# Patient Record
Sex: MALE | Race: OTHER | URBAN - METROPOLITAN AREA
[De-identification: names, ages, dates, MRNs, and addresses within clinical notes are randomized per-mention and may not be internally consistent; named-entity substitution may affect disease eponyms.]

---

## 2020-05-28 ENCOUNTER — EMERGENCY (EMERGENCY)
Facility: HOSPITAL | Age: 55
LOS: 1 days | Discharge: HOME | End: 2020-05-28
Attending: EMERGENCY MEDICINE | Admitting: EMERGENCY MEDICINE
Payer: COMMERCIAL

## 2020-05-28 VITALS
HEIGHT: 63 IN | HEART RATE: 88 BPM | OXYGEN SATURATION: 96 % | DIASTOLIC BLOOD PRESSURE: 88 MMHG | RESPIRATION RATE: 18 BRPM | WEIGHT: 154.1 LBS | SYSTOLIC BLOOD PRESSURE: 144 MMHG | TEMPERATURE: 98 F

## 2020-05-28 DIAGNOSIS — K46.9 UNSPECIFIED ABDOMINAL HERNIA WITHOUT OBSTRUCTION OR GANGRENE: Chronic | ICD-10-CM

## 2020-05-28 LAB

## 2020-05-28 PROCEDURE — 81003 URINALYSIS AUTO W/O SCOPE: CPT

## 2020-05-28 PROCEDURE — 87491 CHLMYD TRACH DNA AMP PROBE: CPT

## 2020-05-28 PROCEDURE — 99284 EMERGENCY DEPT VISIT MOD MDM: CPT | Mod: 25

## 2020-05-28 PROCEDURE — 93975 VASCULAR STUDY: CPT

## 2020-05-28 PROCEDURE — 87591 N.GONORRHOEAE DNA AMP PROB: CPT

## 2020-05-28 PROCEDURE — 99284 EMERGENCY DEPT VISIT MOD MDM: CPT

## 2020-05-28 PROCEDURE — 93975 VASCULAR STUDY: CPT | Mod: 26

## 2020-05-28 RX ORDER — CIPROFLOXACIN LACTATE 400MG/40ML
500 VIAL (ML) INTRAVENOUS ONCE
Refills: 0 | Status: COMPLETED | OUTPATIENT
Start: 2020-05-28 | End: 2020-05-28

## 2020-05-28 RX ORDER — MOXIFLOXACIN HYDROCHLORIDE TABLETS, 400 MG 400 MG/1
1 TABLET, FILM COATED ORAL
Qty: 20 | Refills: 0
Start: 2020-05-28 | End: 2020-06-06

## 2020-05-28 RX ADMIN — Medication 500 MILLIGRAM(S): at 15:24

## 2020-05-28 NOTE — ED PROVIDER NOTE - PATIENT PORTAL LINK FT
You can access the FollowMyHealth Patient Portal offered by [facility] by registering at the following website: [email or web address]. By joining [email or web address]’s FollowMyHealth portal, you will also be able to view your health information using other applications (apps) compatible with our system.

## 2020-05-28 NOTE — ED ADULT NURSE NOTE - OBJECTIVE STATEMENT
Pt c/o rt testicular pain. 7/10. Received Doxycycline 100mg po x10 days from  with relief. Post completion of antibiotic pain resurfaced.

## 2020-05-28 NOTE — ED PROVIDER NOTE - CONSTITUTIONAL, MLM
normal... Well appearing,  male, awake, alert, oriented to person, place, time/situation and in no apparent distress.

## 2020-05-28 NOTE — ED PROVIDER NOTE - CARE PROVIDER_API CALL
John Wolf  UROLOGY  [address] [place] [address] [address]  [place] [address]  Phone: [phone]  Fax: [phone]  Follow Up Time:

## 2020-05-28 NOTE — ED PROVIDER NOTE - OBJECTIVE STATEMENT
55 yo  male presents to ED today c/o few days of aching, swelling to the side of right testicle. Patient had similar symptoms few weeks ago at which time, via Telehealth, Doxycycline was prescribed. Symptoms went away but returned few days ago after finishing course of Doxycycline. At this time patient denies any fever, chills, nausea, vomiting, diarrhea, dysuria or hematuria.

## 2020-05-28 NOTE — ED PROVIDER NOTE - CARE PROVIDERS DIRECT ADDRESSES
[email or web address]

## 2020-05-28 NOTE — ED ADULT NURSE NOTE - CHPI ED NUR SYMPTOMS NEG
no dysuria/no chills/no diarrhea/no nausea/no fever/no hematuria/no abdominal distension/no blood in stool

## 2020-05-28 NOTE — ED ADULT NURSE NOTE - NSIMPLEMENTINTERV_GEN_ALL_ED
Implemented All Universal Safety Interventions:  [place] to call system. Call bell, personal items and telephone within reach. Instruct patient to call for assistance. Room bathroom lighting operational. Non-slip footwear when patient is off stretcher. Physically safe environment: no spills, clutter or unnecessary equipment. Stretcher in lowest position, wheels locked, appropriate side rails in place.

## 2020-05-28 NOTE — ED ADULT TRIAGE NOTE - CHIEF COMPLAINT QUOTE
C/o right testicular pain. States he has associated burning with urination. C/o right testicular pain. States the pain started three weeks ago, he was given antibiotic which helped but then the pain came back last night. States his testicle is red and swollen. States he has associated burning with urination.

## 2020-05-28 NOTE — ED ADULT NURSE NOTE - CHIEF COMPLAINT QUOTE
C/o right testicular pain. States the pain started three weeks ago, he was given antibiotic which helped but then the pain came back last night. States his testicle is red and swollen. States he has associated burning with urination.

## 2020-05-28 NOTE — ED PROVIDER NOTE - NSFOLLOWUPINSTRUCTIONS_ED_ALL_ED_FT
Rest  Start taking CIPRO 500mg, one tablet every 12-hours for the next 10-days  Follow-up with your doctor in 2-3 days for re-evaluation  Return here if needed

## 2020-05-29 LAB
C TRACH RRNA SPEC QL NAA+PROBE: SIGNIFICANT CHANGE UP
N GONORRHOEA RRNA SPEC QL NAA+PROBE: SIGNIFICANT CHANGE UP
SPECIMEN SOURCE: SIGNIFICANT CHANGE UP

## 2020-06-18 NOTE — ED ADULT NURSE NOTE - GENITOURINARY ASSESSMENT
Patient remained stable on home O2. Cough treated symptomatically. Pulmonology consulted, recommended PleurX catheter and patient was amenable to this, placed on 6/19 and drained 850 mLs. Patient had costochondritis chest pain and cough throughout admission, treated symptomatically. Patient was discharged with home health PleurX care.   - - -

## 2021-07-07 NOTE — ED PROVIDER NOTE - NSCAREINITIATED _GEN_ER
----- Message from Clemencia Angel, [address] sent at 7/7/2021 10:15 AM EDT -----  Please let the patient know that his lab work was normal 
Left message advising that labs were normal 
Benjamin Valadez(Attending)

## 2021-07-22 ENCOUNTER — EMERGENCY (EMERGENCY)
Facility: HOSPITAL | Age: 56
LOS: 1 days | Discharge: OTHER INSTITUTION | End: 2021-07-22
Attending: EMERGENCY MEDICINE | Admitting: EMERGENCY MEDICINE
Payer: COMMERCIAL

## 2021-07-22 VITALS
OXYGEN SATURATION: 98 % | SYSTOLIC BLOOD PRESSURE: 113 MMHG | DIASTOLIC BLOOD PRESSURE: 73 MMHG | HEART RATE: 113 BPM | HEIGHT: 63 IN | RESPIRATION RATE: 982 BRPM | WEIGHT: 158.07 LBS

## 2021-07-22 DIAGNOSIS — K46.9 UNSPECIFIED ABDOMINAL HERNIA WITHOUT OBSTRUCTION OR GANGRENE: Chronic | ICD-10-CM

## 2021-07-22 LAB
ALBUMIN SERPL ELPH-MCNC: 3.8 G/DL — SIGNIFICANT CHANGE UP (ref 3.3–5)
ALP SERPL-CCNC: 76 U/L — SIGNIFICANT CHANGE UP (ref 40–120)
ALT FLD-CCNC: 48 U/L — SIGNIFICANT CHANGE UP (ref 12–78)
ANION GAP SERPL CALC-SCNC: 5 MMOL/L — SIGNIFICANT CHANGE UP (ref 5–17)
APPEARANCE UR: CLEAR — SIGNIFICANT CHANGE UP
AST SERPL-CCNC: 17 U/L — SIGNIFICANT CHANGE UP (ref 15–37)
BACTERIA # UR AUTO: ABNORMAL
BASOPHILS # BLD AUTO: 0.02 K/UL — SIGNIFICANT CHANGE UP (ref 0–0.2)
BASOPHILS NFR BLD AUTO: 0.3 % — SIGNIFICANT CHANGE UP (ref 0–2)
BILIRUB SERPL-MCNC: 0.2 MG/DL — SIGNIFICANT CHANGE UP (ref 0.2–1.2)
BILIRUB UR-MCNC: NEGATIVE — SIGNIFICANT CHANGE UP
BUN SERPL-MCNC: 11 MG/DL — SIGNIFICANT CHANGE UP (ref 7–23)
CALCIUM SERPL-MCNC: 8.8 MG/DL — SIGNIFICANT CHANGE UP (ref 8.5–10.1)
CHLORIDE SERPL-SCNC: 111 MMOL/L — HIGH (ref 96–108)
CO2 SERPL-SCNC: 25 MMOL/L — SIGNIFICANT CHANGE UP (ref 22–31)
COLOR SPEC: YELLOW — SIGNIFICANT CHANGE UP
CREAT SERPL-MCNC: 0.84 MG/DL — SIGNIFICANT CHANGE UP (ref 0.5–1.3)
DIFF PNL FLD: NEGATIVE — SIGNIFICANT CHANGE UP
EOSINOPHIL # BLD AUTO: 0.02 K/UL — SIGNIFICANT CHANGE UP (ref 0–0.5)
EOSINOPHIL NFR BLD AUTO: 0.3 % — SIGNIFICANT CHANGE UP (ref 0–6)
EPI CELLS # UR: SIGNIFICANT CHANGE UP
GLUCOSE SERPL-MCNC: 101 MG/DL — HIGH (ref 70–99)
GLUCOSE UR QL: NEGATIVE — SIGNIFICANT CHANGE UP
HCT VFR BLD CALC: 43.8 % — SIGNIFICANT CHANGE UP (ref 39–50)
HGB BLD-MCNC: 14.5 G/DL — SIGNIFICANT CHANGE UP (ref 13–17)
IMM GRANULOCYTES NFR BLD AUTO: 0.5 % — SIGNIFICANT CHANGE UP (ref 0–1.5)
KETONES UR-MCNC: NEGATIVE — SIGNIFICANT CHANGE UP
LEUKOCYTE ESTERASE UR-ACNC: NEGATIVE — SIGNIFICANT CHANGE UP
LYMPHOCYTES # BLD AUTO: 1.31 K/UL — SIGNIFICANT CHANGE UP (ref 1–3.3)
LYMPHOCYTES # BLD AUTO: 21.9 % — SIGNIFICANT CHANGE UP (ref 13–44)
MCHC RBC-ENTMCNC: 27.3 PG — SIGNIFICANT CHANGE UP (ref 27–34)
MCHC RBC-ENTMCNC: 33.1 GM/DL — SIGNIFICANT CHANGE UP (ref 32–36)
MCV RBC AUTO: 82.5 FL — SIGNIFICANT CHANGE UP (ref 80–100)
MONOCYTES # BLD AUTO: 0.38 K/UL — SIGNIFICANT CHANGE UP (ref 0–0.9)
MONOCYTES NFR BLD AUTO: 6.4 % — SIGNIFICANT CHANGE UP (ref 2–14)
NEUTROPHILS # BLD AUTO: 4.22 K/UL — SIGNIFICANT CHANGE UP (ref 1.8–7.4)
NEUTROPHILS NFR BLD AUTO: 70.6 % — SIGNIFICANT CHANGE UP (ref 43–77)
NITRITE UR-MCNC: NEGATIVE — SIGNIFICANT CHANGE UP
NRBC # BLD: 0 /100 WBCS — SIGNIFICANT CHANGE UP (ref 0–0)
PH UR: 5 — SIGNIFICANT CHANGE UP (ref 5–8)
PLATELET # BLD AUTO: 369 K/UL — SIGNIFICANT CHANGE UP (ref 150–400)
POTASSIUM SERPL-MCNC: 4.1 MMOL/L — SIGNIFICANT CHANGE UP (ref 3.5–5.3)
POTASSIUM SERPL-SCNC: 4.1 MMOL/L — SIGNIFICANT CHANGE UP (ref 3.5–5.3)
PROT SERPL-MCNC: 7.1 G/DL — SIGNIFICANT CHANGE UP (ref 6–8.3)
PROT UR-MCNC: NEGATIVE — SIGNIFICANT CHANGE UP
RBC # BLD: 5.31 M/UL — SIGNIFICANT CHANGE UP (ref 4.2–5.8)
RBC # FLD: 12.9 % — SIGNIFICANT CHANGE UP (ref 10.3–14.5)
RBC CASTS # UR COMP ASSIST: SIGNIFICANT CHANGE UP /HPF (ref 0–4)
SODIUM SERPL-SCNC: 141 MMOL/L — SIGNIFICANT CHANGE UP (ref 135–145)
SP GR SPEC: 1 — LOW (ref 1.01–1.02)
UROBILINOGEN FLD QL: NEGATIVE — SIGNIFICANT CHANGE UP
WBC # BLD: 5.98 K/UL — SIGNIFICANT CHANGE UP (ref 3.8–10.5)
WBC # FLD AUTO: 5.98 K/UL — SIGNIFICANT CHANGE UP (ref 3.8–10.5)
WBC UR QL: SIGNIFICANT CHANGE UP

## 2021-07-22 PROCEDURE — 74176 CT ABD & PELVIS W/O CONTRAST: CPT | Mod: 26,MA

## 2021-07-22 PROCEDURE — 80053 COMPREHEN METABOLIC PANEL: CPT

## 2021-07-22 PROCEDURE — 87086 URINE CULTURE/COLONY COUNT: CPT

## 2021-07-22 PROCEDURE — 93975 VASCULAR STUDY: CPT

## 2021-07-22 PROCEDURE — 81001 URINALYSIS AUTO W/SCOPE: CPT

## 2021-07-22 PROCEDURE — 76870 US EXAM SCROTUM: CPT

## 2021-07-22 PROCEDURE — 96374 THER/PROPH/DIAG INJ IV PUSH: CPT

## 2021-07-22 PROCEDURE — 76870 US EXAM SCROTUM: CPT | Mod: 26

## 2021-07-22 PROCEDURE — 99284 EMERGENCY DEPT VISIT MOD MDM: CPT | Mod: 25

## 2021-07-22 PROCEDURE — 36415 COLL VENOUS BLD VENIPUNCTURE: CPT

## 2021-07-22 PROCEDURE — 85025 COMPLETE CBC W/AUTO DIFF WBC: CPT

## 2021-07-22 PROCEDURE — 99284 EMERGENCY DEPT VISIT MOD MDM: CPT

## 2021-07-22 PROCEDURE — 93975 VASCULAR STUDY: CPT | Mod: 26

## 2021-07-22 PROCEDURE — 74176 CT ABD & PELVIS W/O CONTRAST: CPT | Mod: MA

## 2021-07-22 RX ORDER — KETOROLAC TROMETHAMINE 30 MG/ML
15 SYRINGE (ML) INJECTION ONCE
Refills: 0 | Status: DISCONTINUED | OUTPATIENT
Start: 2021-07-22 | End: 2021-07-22

## 2021-07-22 RX ORDER — SODIUM CHLORIDE 9 MG/ML
1000 INJECTION INTRAMUSCULAR; INTRAVENOUS; SUBCUTANEOUS ONCE
Refills: 0 | Status: COMPLETED | OUTPATIENT
Start: 2021-07-22 | End: 2021-07-22

## 2021-07-22 RX ADMIN — Medication 15 MILLIGRAM(S): at 13:12

## 2021-07-22 RX ADMIN — SODIUM CHLORIDE 1000 MILLILITER(S): 9 INJECTION INTRAMUSCULAR; INTRAVENOUS; SUBCUTANEOUS at 13:13

## 2021-07-22 NOTE — ED PROVIDER NOTE - OBJECTIVE STATEMENT
Pt is a 54 yo male with pmhx of right inguinal hernia repair 05/21 at [facility] c/o of worsening right testicular pain and swelling for past 3-4 days. Pt states he has been having pain since surgery and saw Dr. Maria who advised needs more time to heal. Pt denies any nvd fever chills. Pt is a 56 yo male with pmhx of right inguinal hernia repair 05/21 at [facility] c/o of worsening right testicular pain and swelling for past 3-4 days. Pt states he has been having pain since surgery and saw Dr. Maria who advised needs more time to heal. Pt denies any nvd fever chills dysuria or hematuria.

## 2021-07-22 NOTE — ED PROVIDER NOTE - PATIENT PORTAL LINK FT
You can access the FollowMyHealth Patient Portal offered by [facility] by registering at the following website: [email or web address]. By joining [email or web address]’s FollowMyHealth portal, you will also be able to view your health information using other applications (apps) compatible with our system.

## 2021-07-22 NOTE — ED PROVIDER NOTE - NSFOLLOWUPINSTRUCTIONS_ED_ALL_ED_FT
Follow up with your urologist  return to er for any worsening symptoms    Hidrocele    LO QUE NECESITA SABER:    ¿Qué es un hidrocele?Un hidrocele es paola acumulación de líquido dentro del escroto. El escroto contiene los testículos. Los hidroceles pueden ocurrir en gurvinder o ambos lados del escroto y crecen lentamente. Estos son comunes en los recién nacidos. También se presentan en niños y adultos. Hay 2 tipos de hidroceles:   •Hidrocele simple:Un hidrocele simple se puede formar a cualquier edad. [place] tipo de hidrocele no se agranda ni se achica.      •Hidrocele comunicante:Un hidrocele comunicante se puede formar a cualquier edad selina es más común en bebés y niños. [place] tipo de hidrocele si se agranda o se achica. Los cambios en tamaño son causados por líquido que fluye del abdomen por un tubo hacia el escroto. [place] ocurre cuando el tubo no se deidre belinda debe. El hidrocele puede agrandarse cuando usted está activo. Se puede achicar cuando usted está en reposo. Un hidrocele puede presentarse con paola hernia. Paola hernia es cuando parte del intestino pasa a través de un agujero en la pared del abdomen.      ¿Qué causa un hidrocele?Los hidroceles por lo general no tienen paola causa específica. Paola lesión en el escroto puede causar que se forme un hidrocele. La lesión podría ser un golpe en el escroto terri la actividad física o un accidente automovilístico. Los hidroceles también se pueden formar después de la cirugía o paola infección en el escroto.    ¿Cuáles son los signos y síntomas de un hidrocele?Un escroto indoloro, inflamado es el signo más común de un hidrocele. Iqbal escroto puede que se sienta adolorido y pesado a causa de la inflamación.    ¿Cómo se diagnostica un hidrocele?Iqbal médico le preguntará acerca de la inflamación del escroto y lo examinará. Informe a iqbal médico acerca de cualquier lesión en el escroto. El médico aplicará presión suave en iqbal escroto para comprobar si el hidrocele se contrae. Iqbal médico puede ordenar estas u otras pruebas:   •Transiluminación:La transiluminación es cuando iqbal médico apunta paola jose brillante sobre el escroto. [place] ayuda al médico a perry el líquido dentro de iqbal escroto. La transiluminación puede ayudar a identificar otros problemas, belinda paola hernia.      •Ultrasonido:Un ultrasonido utiliza ondas sonoras para mostrar imágenes de iqbal escroto en paola pantalla. Un ultrasonido puede ayudar a confirmar la presencia de un hidrocele e identificar cualquier otro problema con el escroto.      ¿Cómo se trata un hidrocele?Iqbal hidrocele por lo general desaparece por sí solo. El hidrocele de iqbal hijo por lo general desaparecerá cuando el tenga 2 años. Podría aurora la necesidad de extraer el hidrocele si éste no desaparecer, o sí se vuelve muy isidoro. .   •Soporte para el escroto:Es posible que necesite usar un dispositivo de soporte de tir similar a un suspensorio para disminuir la inflamación.      •Hidrocelectomía:La hidrocelectomía es paola cirugía para extraer iqbal hidrocele. Los médicos hacen paola incisión en el escroto o la mohan. [place] la cirugía para retirar un hidrocele simple, se hace paola incisión pequeña y se extrae el líquido. [place] la cirugía para retirar un hidrocele comunicante, los médicos utilizan puntos de sutura para cerrar el tubo. Los puntos detienen el flujo de líquido del hidrocele hacia el abdomen.      •Aspiración con aguja:Los médicos colocan paola aguja a través del escroto y hasta el hidrocele. El líquido es drenado de iqbal escroto a través de la aguja.      ¿Cuáles son los riesgos de tener un hidrocele?  •Iqbal hidrocele puede que no desaparezca por sí solo. Es posible que se agrande y cause dolor o paola sensación de pesadez. Es posible que usted también tenga paola hernia si tiene un hidrocele comunicante. Si usted no recibe tratamiento para la hernia, ésta puede causar dolor y daño a wolf órganos.      •Usted podría contraer paola infección después de la cirugía. Existe paola posibilidad de que usted tenga problemas de fertilidad después de la cirugía. La cirugía para extraer el hidrocele puede ocasionar que se forme otro hidrocele simple. Después de la cirugía o aspiración, es posible que el hidrocele regrese.      ¿Cuándo bucky comunicarme con mi médico?  •Iqbal escroto está inflamado.      •La inflamación se agranda o no desaparece.      •Usted tiene preguntas o inquietudes acerca de iqbal condición o cuidado.      ¿Cuándo bucky buscar atención inmediata?  •Tiene dolor e inflamación intensos después de paola lesión en el escroto.          ACUERDOS SOBRE IQBAL CUIDADO:    Usted tiene el derecho de ayudar a planear iqbal cuidado. Aprenda todo lo que pueda sobre iqbal condición y belinda darle tratamiento. Discuta wolf opciones de tratamiento con wolf médicos para decidir el cuidado que usted desea recibir. Usted siempre tiene el derecho de rechazar el tratamiento.

## 2021-07-22 NOTE — ED PROVIDER NOTE - CARE PROVIDER_API CALL
John Wolf)  Urology  [address] [place] [address], [address]  [place] [address]  Phone: [phone]  Fax: [phone]  Follow Up Time:

## 2021-07-22 NOTE — ED PROVIDER NOTE - CARE PROVIDERS DIRECT ADDRESSES
[email or web address]

## 2021-07-22 NOTE — ED PROVIDER NOTE - ATTENDING CONTRIBUTION TO CARE
55 male recent inguinal hernia repair May 2021 [facility] Dr. Bear, c/o right testicular pain, for the past month, getting worse today, had seen urologist one week ago Dr. Wolf, was told to take motrin, patient alert and oriented, right testicular swelling, tender to palpation, noted inguinal scar from sugery, clean, dry and intact, no signs of cellulitis, f/u US testices, ct abdomen/pelvis, pain control, call urology.

## 2021-07-22 NOTE — ED PROVIDER NOTE - GENITOURINARY, MLM
+ surgical scar right groin + Moderate tenderness overlying area of swelling in /on right lateral scrotal area

## 2021-07-23 LAB
CULTURE RESULTS: NO GROWTH — SIGNIFICANT CHANGE UP
SPECIMEN SOURCE: SIGNIFICANT CHANGE UP

## 2021-07-28 PROBLEM — Z00.00 ENCOUNTER FOR PREVENTIVE HEALTH EXAMINATION: Status: ACTIVE | Noted: 2021-07-28

## 2021-07-29 ENCOUNTER — APPOINTMENT (OUTPATIENT)
Dept: UROLOGY | Facility: CLINIC | Age: 56
End: 2021-07-29
Payer: COMMERCIAL

## 2021-07-29 VITALS
WEIGHT: 159 LBS | OXYGEN SATURATION: 97 % | BODY MASS INDEX: 28.17 KG/M2 | SYSTOLIC BLOOD PRESSURE: 139 MMHG | DIASTOLIC BLOOD PRESSURE: 94 MMHG | HEART RATE: 111 BPM | HEIGHT: 63 IN

## 2021-07-29 PROCEDURE — 99204 OFFICE O/P NEW MOD 45 MIN: CPT

## 2021-07-29 NOTE — PHYSICAL EXAM
[Normal Appearance] : normal appearance [General Appearance - In No Acute Distress] : no acute distress [] : no respiratory distress [Abdomen Soft] : soft [Abdomen Tenderness] : non-tender [Costovertebral Angle Tenderness] : no ~M costovertebral angle tenderness [Urethral Meatus] : meatus normal [Penis Abnormality] : normal uncircumcised penis [FreeTextEntry1] : enlarged right scrotum, right moderate hydrocele, .slight tender to touch  [Normal Station and Gait] : the gait and station were normal for the patient's age [Skin Color & Pigmentation] : normal skin color and pigmentation [No Focal Deficits] : no focal deficits [Oriented To Time, Place, And Person] : oriented to person, place, and time

## 2021-07-29 NOTE — ASSESSMENT
[FreeTextEntry1] : Reviewed outside records. \par \par Epididymal cyst:\par Discussed that an epididymal cyst is a fluid-filled sac which grows at the top end of the testicle. It is benign and not malignant. Observation is usually used for simple, small asymptomatic spermatoceles and surgery for complex, large symptomatic ones. \par When symptomatic recommend:\par Scrotal support.\par NSAIDs PRN. \par \par Hydrocele:\par Discussed treatment options- continued observation, aspiration and surgical repair. Risks and benefits of each were discussed. Patient wants to proceed with Surgery. \par \par The risks and complications of the procedure were extensively discussed with the patient. The general risks of this procedure include, but are not limited to bleeding, transfusion, infection, wound infection/dehiscence, pain, scaring of tissues, failure of the procedure, potential injury to other surrounding structures.\par The specific risks of this procedure include, but are not limited to: complete breakdown of the repair, prolonged wound drainage, injury to spermatic vessels, testicle and vas deferens which may lead to loss of testicle or future infertility problems. Possible injury to the ilioinguinal nerve is also possible and could lead to anesthetic areas on the scrotal, penile and inguinal skin. \par \par All questions were fully and satisfactorily answered. \par Patient verbalized understanding and wants to proceed with the procedure. \par \par Will schedule for right hydrocelectomy and spermatocelectomy. \par Explained that I am doing these procedures [facility] and that Patient will need medical clearance from PCP and pre-surgical testing at [facility] before the procedure.

## 2021-07-29 NOTE — REVIEW OF SYSTEMS
[both] : pain during and after intercourse [denies] : denies pain with orgasm [base] : pain in base of penis [Wake up at night to urinate  How many times?  ___] : wakes up to urinate [unfilled] times during the night [Wait a long time to urinate] : waits a long time to urinate [Joint Pain] : joint pain [Joint Swelling] : joint swelling [Negative] : Heme/Lymph

## 2021-07-29 NOTE — HISTORY OF PRESENT ILLNESS
[FreeTextEntry1] : 56 yo male presents for Hydrocele. \par Spoke with son on the phone. \par Had right inguinal hernia surgery With Dr Bear at [facility] in May 2021 and since then has developed right scrotal swelling with pain and discomfort. Has been getting bigger and causing more pain. \par Recently was at [facility] and had CT scan and Scrotal Ultrasound. \par Had seen Dr Wolf who recommended observation. \par Denies any difficulty with urination. \par Denies dysuria, hematuria, lower abdominal or flank pain, nausea, vomiting, fever, chills or rigors.

## 2021-08-13 DIAGNOSIS — R39.9 UNSPECIFIED SYMPTOMS AND SIGNS INVOLVING THE GENITOURINARY SYSTEM: ICD-10-CM

## 2021-08-13 RX ORDER — CEFUROXIME AXETIL 500 MG/1
500 TABLET ORAL
Qty: 14 | Refills: 0 | Status: ACTIVE | COMMUNITY
Start: 2021-08-13 | End: 1900-01-01

## 2021-09-09 ENCOUNTER — EMERGENCY (EMERGENCY)
Facility: HOSPITAL | Age: 56
LOS: 0 days | Discharge: HOME | End: 2021-09-09
Attending: STUDENT IN AN ORGANIZED HEALTH CARE EDUCATION/TRAINING PROGRAM
Payer: OTHER MISCELLANEOUS

## 2021-09-09 VITALS
OXYGEN SATURATION: 100 % | SYSTOLIC BLOOD PRESSURE: 124 MMHG | TEMPERATURE: 98 F | HEART RATE: 115 BPM | WEIGHT: 158.95 LBS | DIASTOLIC BLOOD PRESSURE: 74 MMHG | HEIGHT: 63 IN | RESPIRATION RATE: 17 BRPM

## 2021-09-09 VITALS
SYSTOLIC BLOOD PRESSURE: 122 MMHG | RESPIRATION RATE: 16 BRPM | HEART RATE: 83 BPM | TEMPERATURE: 99 F | DIASTOLIC BLOOD PRESSURE: 80 MMHG | OXYGEN SATURATION: 96 %

## 2021-09-09 DIAGNOSIS — N50.811 RIGHT TESTICULAR PAIN: ICD-10-CM

## 2021-09-09 DIAGNOSIS — K46.9 UNSPECIFIED ABDOMINAL HERNIA WITHOUT OBSTRUCTION OR GANGRENE: Chronic | ICD-10-CM

## 2021-09-09 DIAGNOSIS — Z87.19 PERSONAL HISTORY OF OTHER DISEASES OF THE DIGESTIVE SYSTEM: ICD-10-CM

## 2021-09-09 DIAGNOSIS — R10.31 RIGHT LOWER QUADRANT PAIN: ICD-10-CM

## 2021-09-09 LAB
ALBUMIN SERPL ELPH-MCNC: 3.9 G/DL — SIGNIFICANT CHANGE UP (ref 3.3–5)
ALP SERPL-CCNC: 70 U/L — SIGNIFICANT CHANGE UP (ref 40–120)
ALT FLD-CCNC: 44 U/L — SIGNIFICANT CHANGE UP (ref 12–78)
ANION GAP SERPL CALC-SCNC: 7 MMOL/L — SIGNIFICANT CHANGE UP (ref 5–17)
APPEARANCE UR: CLEAR — SIGNIFICANT CHANGE UP
APTT BLD: 30.6 SEC — SIGNIFICANT CHANGE UP (ref 27.5–35.5)
AST SERPL-CCNC: 16 U/L — SIGNIFICANT CHANGE UP (ref 15–37)
BASOPHILS # BLD AUTO: 0.01 K/UL — SIGNIFICANT CHANGE UP (ref 0–0.2)
BASOPHILS NFR BLD AUTO: 0.1 % — SIGNIFICANT CHANGE UP (ref 0–2)
BILIRUB SERPL-MCNC: 0.2 MG/DL — SIGNIFICANT CHANGE UP (ref 0.2–1.2)
BILIRUB UR-MCNC: NEGATIVE — SIGNIFICANT CHANGE UP
BUN SERPL-MCNC: 13 MG/DL — SIGNIFICANT CHANGE UP (ref 7–23)
CALCIUM SERPL-MCNC: 8.9 MG/DL — SIGNIFICANT CHANGE UP (ref 8.5–10.1)
CHLORIDE SERPL-SCNC: 107 MMOL/L — SIGNIFICANT CHANGE UP (ref 96–108)
CO2 SERPL-SCNC: 25 MMOL/L — SIGNIFICANT CHANGE UP (ref 22–31)
COLOR SPEC: YELLOW — SIGNIFICANT CHANGE UP
CREAT SERPL-MCNC: 0.88 MG/DL — SIGNIFICANT CHANGE UP (ref 0.5–1.3)
DIFF PNL FLD: ABNORMAL
EOSINOPHIL # BLD AUTO: 0.01 K/UL — SIGNIFICANT CHANGE UP (ref 0–0.5)
EOSINOPHIL NFR BLD AUTO: 0.1 % — SIGNIFICANT CHANGE UP (ref 0–6)
GLUCOSE SERPL-MCNC: 117 MG/DL — HIGH (ref 70–99)
GLUCOSE UR QL: NEGATIVE MG/DL — SIGNIFICANT CHANGE UP
HCT VFR BLD CALC: 43.6 % — SIGNIFICANT CHANGE UP (ref 39–50)
HGB BLD-MCNC: 14.5 G/DL — SIGNIFICANT CHANGE UP (ref 13–17)
IMM GRANULOCYTES NFR BLD AUTO: 0.1 % — SIGNIFICANT CHANGE UP (ref 0–1.5)
INR BLD: 1.06 RATIO — SIGNIFICANT CHANGE UP (ref 0.88–1.16)
KETONES UR-MCNC: NEGATIVE — SIGNIFICANT CHANGE UP
LACTATE SERPL-SCNC: 1 MMOL/L — SIGNIFICANT CHANGE UP (ref 0.7–2)
LEUKOCYTE ESTERASE UR-ACNC: NEGATIVE — SIGNIFICANT CHANGE UP
LIDOCAIN IGE QN: 115 U/L — SIGNIFICANT CHANGE UP (ref 73–393)
LYMPHOCYTES # BLD AUTO: 0.97 K/UL — LOW (ref 1–3.3)
LYMPHOCYTES # BLD AUTO: 13.4 % — SIGNIFICANT CHANGE UP (ref 13–44)
MCHC RBC-ENTMCNC: 27.6 PG — SIGNIFICANT CHANGE UP (ref 27–34)
MCHC RBC-ENTMCNC: 33.3 GM/DL — SIGNIFICANT CHANGE UP (ref 32–36)
MCV RBC AUTO: 83 FL — SIGNIFICANT CHANGE UP (ref 80–100)
MONOCYTES # BLD AUTO: 0.4 K/UL — SIGNIFICANT CHANGE UP (ref 0–0.9)
MONOCYTES NFR BLD AUTO: 5.5 % — SIGNIFICANT CHANGE UP (ref 2–14)
NEUTROPHILS # BLD AUTO: 5.84 K/UL — SIGNIFICANT CHANGE UP (ref 1.8–7.4)
NEUTROPHILS NFR BLD AUTO: 80.8 % — HIGH (ref 43–77)
NITRITE UR-MCNC: NEGATIVE — SIGNIFICANT CHANGE UP
PH UR: 5 — SIGNIFICANT CHANGE UP (ref 5–8)
PLATELET # BLD AUTO: 361 K/UL — SIGNIFICANT CHANGE UP (ref 150–400)
POTASSIUM SERPL-MCNC: 3.8 MMOL/L — SIGNIFICANT CHANGE UP (ref 3.5–5.3)
POTASSIUM SERPL-SCNC: 3.8 MMOL/L — SIGNIFICANT CHANGE UP (ref 3.5–5.3)
PROT SERPL-MCNC: 7.1 GM/DL — SIGNIFICANT CHANGE UP (ref 6–8.3)
PROT UR-MCNC: NEGATIVE MG/DL — SIGNIFICANT CHANGE UP
PROTHROM AB SERPL-ACNC: 12.3 SEC — SIGNIFICANT CHANGE UP (ref 10.6–13.6)
RBC # BLD: 5.25 M/UL — SIGNIFICANT CHANGE UP (ref 4.2–5.8)
RBC # FLD: 12.7 % — SIGNIFICANT CHANGE UP (ref 10.3–14.5)
SARS-COV-2 RNA SPEC QL NAA+PROBE: SIGNIFICANT CHANGE UP
SODIUM SERPL-SCNC: 139 MMOL/L — SIGNIFICANT CHANGE UP (ref 135–145)
SP GR SPEC: 1 — LOW (ref 1.01–1.02)
UROBILINOGEN FLD QL: NEGATIVE MG/DL — SIGNIFICANT CHANGE UP
WBC # BLD: 7.24 K/UL — SIGNIFICANT CHANGE UP (ref 3.8–10.5)
WBC # FLD AUTO: 7.24 K/UL — SIGNIFICANT CHANGE UP (ref 3.8–10.5)

## 2021-09-09 PROCEDURE — 80053 COMPREHEN METABOLIC PANEL: CPT

## 2021-09-09 PROCEDURE — 83690 ASSAY OF LIPASE: CPT

## 2021-09-09 PROCEDURE — 99284 EMERGENCY DEPT VISIT MOD MDM: CPT | Mod: 25

## 2021-09-09 PROCEDURE — 76870 US EXAM SCROTUM: CPT | Mod: 26

## 2021-09-09 PROCEDURE — 96374 THER/PROPH/DIAG INJ IV PUSH: CPT

## 2021-09-09 PROCEDURE — 85025 COMPLETE CBC W/AUTO DIFF WBC: CPT

## 2021-09-09 PROCEDURE — 36415 COLL VENOUS BLD VENIPUNCTURE: CPT

## 2021-09-09 PROCEDURE — 83605 ASSAY OF LACTIC ACID: CPT

## 2021-09-09 PROCEDURE — 85730 THROMBOPLASTIN TIME PARTIAL: CPT

## 2021-09-09 PROCEDURE — U0003: CPT

## 2021-09-09 PROCEDURE — U0005: CPT

## 2021-09-09 PROCEDURE — 76870 US EXAM SCROTUM: CPT

## 2021-09-09 PROCEDURE — 99285 EMERGENCY DEPT VISIT HI MDM: CPT

## 2021-09-09 PROCEDURE — 87086 URINE CULTURE/COLONY COUNT: CPT

## 2021-09-09 PROCEDURE — 85610 PROTHROMBIN TIME: CPT

## 2021-09-09 PROCEDURE — 74177 CT ABD & PELVIS W/CONTRAST: CPT | Mod: 26,MG

## 2021-09-09 PROCEDURE — 81001 URINALYSIS AUTO W/SCOPE: CPT

## 2021-09-09 PROCEDURE — 74177 CT ABD & PELVIS W/CONTRAST: CPT

## 2021-09-09 PROCEDURE — G1004: CPT

## 2021-09-09 RX ORDER — OXYCODONE AND ACETAMINOPHEN 5; 325 MG/1; MG/1
1 TABLET ORAL
Qty: 8 | Refills: 0
Start: 2021-09-09 | End: 2021-09-10

## 2021-09-09 RX ORDER — MORPHINE SULFATE 50 MG/1
4 CAPSULE, EXTENDED RELEASE ORAL ONCE
Refills: 0 | Status: DISCONTINUED | OUTPATIENT
Start: 2021-09-09 | End: 2021-09-09

## 2021-09-09 RX ORDER — SODIUM CHLORIDE 9 MG/ML
1000 INJECTION INTRAMUSCULAR; INTRAVENOUS; SUBCUTANEOUS ONCE
Refills: 0 | Status: COMPLETED | OUTPATIENT
Start: 2021-09-09 | End: 2021-09-09

## 2021-09-09 RX ADMIN — SODIUM CHLORIDE 1000 MILLILITER(S): 9 INJECTION INTRAMUSCULAR; INTRAVENOUS; SUBCUTANEOUS at 10:28

## 2021-09-09 RX ADMIN — MORPHINE SULFATE 4 MILLIGRAM(S): 50 CAPSULE, EXTENDED RELEASE ORAL at 10:28

## 2021-09-09 NOTE — ED PROVIDER NOTE - OBJECTIVE STATEMENT
56y M with a PMHx of inguinal hernia s/p repair presents to the ED c/o right groin/testicular pain since surgery. Pt s/p right inguinal hernia repair 05/21 at [facility] with Dr. Parekh. Denies n/v/d. No other complaints at this time.

## 2021-09-09 NOTE — ED ADULT TRIAGE NOTE - CHIEF COMPLAINT QUOTE
pt is s/p hernia repair in May and since then has been having recurrent testicular pain, RLQ pain, and now back pain.  Pt is scheduled for surgery for a hydrocele on 9/20 this month but has had increased pain over the past few days which prompted him to the ED.  pt is a patient of DR. Parekh.

## 2021-09-09 NOTE — ED PROVIDER NOTE - PATIENT PORTAL LINK FT
You can access the FollowMyHealth Patient Portal offered by [facility] by registering at the following website: [email or web address]. By joining [employer]’s FollowMyHealth portal, you will also be able to view your health information using other applications (apps) compatible with our system.

## 2021-09-09 NOTE — ED ADULT NURSE NOTE - OBJECTIVE STATEMENT
patient presents to ED complaining of testicular pain. reports he is supposed to have a surgery for hydrocele tomorrow but the pain is too bad. patient AOx4, breathing symmetrical and unlabored, #20 IV inserted into L. forearm, bloods drawn and sent to lab, patient ambulatory to bathroom, urine provided, collected, and sent to lab, patient in no acute distress at this time, will continue to monitor.

## 2021-09-09 NOTE — ED PROVIDER NOTE - CARE PROVIDER_API CALL
Rian Parekh)  Urology  [address], [address] [place] [address]  Phone: [phone]  Fax: [phone]  Follow Up Time:

## 2021-09-09 NOTE — ED PROVIDER NOTE - PROGRESS NOTE DETAILS
Nirav LYLES: spoke to Dr. Varma/urology PA on call for Dr. Parekh; diagnostics reviewed; no indication for emergent surgery at this time; patient has presurgical testing tomorrow and can f/u as outpatient; strict return precautions given.

## 2021-09-09 NOTE — ED PROVIDER NOTE - NSFOLLOWUPINSTRUCTIONS_ED_ALL_ED_FT
Hidrocele    LO QUE NECESITA SABER:    ¿Qué es un hidrocele?Un hidrocele es paola acumulación de líquido dentro del escroto. El escroto contiene los testículos. Los hidroceles pueden ocurrir en gurvinder o ambos lados del escroto y crecen lentamente. Estos son comunes en los recién nacidos. También se presentan en niños y adultos. Hay 2 tipos de hidroceles:   •Hidrocele simple:Un hidrocele simple se puede formar a cualquier edad. [place] tipo de hidrocele no se agranda ni se achica.      •Hidrocele comunicante:Un hidrocele comunicante se puede formar a cualquier edad selina es más común en bebés y niños. [place] tipo de hidrocele si se agranda o se achica. Los cambios en tamaño son causados por líquido que fluye del abdomen por un tubo hacia el escroto. [place] ocurre cuando el tubo no se deidre belinda debe. El hidrocele puede agrandarse cuando usted está activo. Se puede achicar cuando usted está en reposo. Un hidrocele puede presentarse con paola hernia. Paola hernia es cuando parte del intestino pasa a través de un agujero en la pared del abdomen.      ¿Qué causa un hidrocele?Los hidroceles por lo general no tienen paola causa específica. Paola lesión en el escroto puede causar que se forme un hidrocele. La lesión podría ser un golpe en el escroto terri la actividad física o un accidente automovilístico. Los hidroceles también se pueden formar después de la cirugía o paola infección en el escroto.    ¿Cuáles son los signos y síntomas de un hidrocele?Un escroto indoloro, inflamado es el signo más común de un hidrocele. Iqbal escroto puede que se sienta adolorido y pesado a causa de la inflamación.    ¿Cómo se diagnostica un hidrocele?Iqbal médico le preguntará acerca de la inflamación del escroto y lo examinará. Informe a iqbal médico acerca de cualquier lesión en el escroto. El médico aplicará presión suave en iqbal escroto para comprobar si el hidrocele se contrae. Iqbal médico puede ordenar estas u otras pruebas:   •Transiluminación:La transiluminación es cuando iqbal médico apunta paola jose brillante sobre el escroto. [place] ayuda al médico a perry el líquido dentro de iqbal escroto. La transiluminación puede ayudar a identificar otros problemas, belinda paola hernia.      •Ultrasonido:Un ultrasonido utiliza ondas sonoras para mostrar imágenes de iqbal escroto en paola pantalla. Un ultrasonido puede ayudar a confirmar la presencia de un hidrocele e identificar cualquier otro problema con el escroto.      ¿Cómo se trata un hidrocele?Iqbal hidrocele por lo general desaparece por sí solo. El hidrocele de iqbal hijo por lo general desaparecerá cuando el tenga 2 años. Podría aurora la necesidad de extraer el hidrocele si éste no desaparecer, o sí se vuelve muy isidoro. .   •Soporte para el escroto:Es posible que necesite usar un dispositivo de soporte de tri similar a un suspensorio para disminuir la inflamación.      •Hidrocelectomía:La hidrocelectomía es paola cirugía para extraer iqbal hidrocele. Los médicos hacen paola incisión en el escroto o la mohan. [place] la cirugía para retirar un hidrocele simple, se hace paola incisión pequeña y se extrae el líquido. [place] la cirugía para retirar un hidrocele comunicante, los médicos utilizan puntos de sutura para cerrar el tubo. Los puntos detienen el flujo de líquido del hidrocele hacia el abdomen.      •Aspiración con aguja:Los médicos colocan paola aguja a través del escroto y hasta el hidrocele. El líquido es drenado de iqbal escroto a través de la aguja.      ¿Cuáles son los riesgos de tener un hidrocele?  •Iqbal hidrocele puede que no desaparezca por sí solo. Es posible que se agrande y cause dolor o paola sensación de pesadez. Es posible que usted también tenga paola hernia si tiene un hidrocele comunicante. Si usted no recibe tratamiento para la hernia, ésta puede causar dolor y daño a wolf órganos.      •Usted podría contraer paola infección después de la cirugía. Existe paola posibilidad de que usted tenga problemas de fertilidad después de la cirugía. La cirugía para extraer el hidrocele puede ocasionar que se forme otro hidrocele simple. Después de la cirugía o aspiración, es posible que el hidrocele regrese.      ¿Cuándo bucky comunicarme con mi médico?  •Iqbal escroto está inflamado.      •La inflamación se agranda o no desaparece.      •Usted tiene preguntas o inquietudes acerca de iqbal condición o cuidado.      ¿Cuándo bucky buscar atención inmediata?  •Tiene dolor e inflamación intensos después de paola lesión en el escroto.          ACUERDOS SOBRE IQBAL CUIDADO:    Usted tiene el derecho de ayudar a planear iqbal cuidado. Aprenda todo lo que pueda sobre iqbal condición y belinda darle tratamiento. Discuta wolf opciones de tratamiento con wolf médicos para decidir el cuidado que usted desea recibir. Usted siempre tiene el derecho de rechazar el tratamiento.

## 2021-09-10 ENCOUNTER — OUTPATIENT (OUTPATIENT)
Dept: OUTPATIENT SERVICES | Facility: HOSPITAL | Age: 56
LOS: 1 days | End: 2021-09-10
Payer: OTHER MISCELLANEOUS

## 2021-09-10 ENCOUNTER — RESULT REVIEW (OUTPATIENT)
Age: 56
End: 2021-09-10

## 2021-09-10 VITALS
RESPIRATION RATE: 16 BRPM | WEIGHT: 158.95 LBS | HEIGHT: 65 IN | DIASTOLIC BLOOD PRESSURE: 72 MMHG | OXYGEN SATURATION: 100 % | TEMPERATURE: 98 F | SYSTOLIC BLOOD PRESSURE: 130 MMHG | HEART RATE: 81 BPM

## 2021-09-10 DIAGNOSIS — Z98.890 OTHER SPECIFIED POSTPROCEDURAL STATES: Chronic | ICD-10-CM

## 2021-09-10 DIAGNOSIS — Z01.818 ENCOUNTER FOR OTHER PREPROCEDURAL EXAMINATION: ICD-10-CM

## 2021-09-10 DIAGNOSIS — N50.3 CYST OF EPIDIDYMIS: ICD-10-CM

## 2021-09-10 DIAGNOSIS — K46.9 UNSPECIFIED ABDOMINAL HERNIA WITHOUT OBSTRUCTION OR GANGRENE: Chronic | ICD-10-CM

## 2021-09-10 LAB
ANION GAP SERPL CALC-SCNC: 4 MMOL/L — LOW (ref 5–17)
APPEARANCE UR: CLEAR — SIGNIFICANT CHANGE UP
APTT BLD: 30.1 SEC — SIGNIFICANT CHANGE UP (ref 27.5–35.5)
BASOPHILS # BLD AUTO: 0.02 K/UL — SIGNIFICANT CHANGE UP (ref 0–0.2)
BASOPHILS NFR BLD AUTO: 0.4 % — SIGNIFICANT CHANGE UP (ref 0–2)
BILIRUB UR-MCNC: NEGATIVE — SIGNIFICANT CHANGE UP
BUN SERPL-MCNC: 14 MG/DL — SIGNIFICANT CHANGE UP (ref 7–23)
CALCIUM SERPL-MCNC: 9.3 MG/DL — SIGNIFICANT CHANGE UP (ref 8.5–10.1)
CHLORIDE SERPL-SCNC: 109 MMOL/L — HIGH (ref 96–108)
CO2 SERPL-SCNC: 28 MMOL/L — SIGNIFICANT CHANGE UP (ref 22–31)
COLOR SPEC: YELLOW — SIGNIFICANT CHANGE UP
CREAT SERPL-MCNC: 0.98 MG/DL — SIGNIFICANT CHANGE UP (ref 0.5–1.3)
CULTURE RESULTS: NO GROWTH — SIGNIFICANT CHANGE UP
DIFF PNL FLD: NEGATIVE — SIGNIFICANT CHANGE UP
EOSINOPHIL # BLD AUTO: 0.03 K/UL — SIGNIFICANT CHANGE UP (ref 0–0.5)
EOSINOPHIL NFR BLD AUTO: 0.6 % — SIGNIFICANT CHANGE UP (ref 0–6)
GLUCOSE SERPL-MCNC: 95 MG/DL — SIGNIFICANT CHANGE UP (ref 70–99)
GLUCOSE UR QL: NEGATIVE MG/DL — SIGNIFICANT CHANGE UP
HCT VFR BLD CALC: 45.7 % — SIGNIFICANT CHANGE UP (ref 39–50)
HGB BLD-MCNC: 14.8 G/DL — SIGNIFICANT CHANGE UP (ref 13–17)
IMM GRANULOCYTES NFR BLD AUTO: 0.2 % — SIGNIFICANT CHANGE UP (ref 0–1.5)
INR BLD: 0.99 RATIO — SIGNIFICANT CHANGE UP (ref 0.88–1.16)
KETONES UR-MCNC: NEGATIVE — SIGNIFICANT CHANGE UP
LEUKOCYTE ESTERASE UR-ACNC: NEGATIVE — SIGNIFICANT CHANGE UP
LYMPHOCYTES # BLD AUTO: 1.34 K/UL — SIGNIFICANT CHANGE UP (ref 1–3.3)
LYMPHOCYTES # BLD AUTO: 25.2 % — SIGNIFICANT CHANGE UP (ref 13–44)
MCHC RBC-ENTMCNC: 27.5 PG — SIGNIFICANT CHANGE UP (ref 27–34)
MCHC RBC-ENTMCNC: 32.4 GM/DL — SIGNIFICANT CHANGE UP (ref 32–36)
MCV RBC AUTO: 84.8 FL — SIGNIFICANT CHANGE UP (ref 80–100)
MONOCYTES # BLD AUTO: 0.41 K/UL — SIGNIFICANT CHANGE UP (ref 0–0.9)
MONOCYTES NFR BLD AUTO: 7.7 % — SIGNIFICANT CHANGE UP (ref 2–14)
NEUTROPHILS # BLD AUTO: 3.5 K/UL — SIGNIFICANT CHANGE UP (ref 1.8–7.4)
NEUTROPHILS NFR BLD AUTO: 65.9 % — SIGNIFICANT CHANGE UP (ref 43–77)
NITRITE UR-MCNC: NEGATIVE — SIGNIFICANT CHANGE UP
PH UR: 5 — SIGNIFICANT CHANGE UP (ref 5–8)
PLATELET # BLD AUTO: 383 K/UL — SIGNIFICANT CHANGE UP (ref 150–400)
POTASSIUM SERPL-MCNC: 4.5 MMOL/L — SIGNIFICANT CHANGE UP (ref 3.5–5.3)
POTASSIUM SERPL-SCNC: 4.5 MMOL/L — SIGNIFICANT CHANGE UP (ref 3.5–5.3)
PROT UR-MCNC: NEGATIVE MG/DL — SIGNIFICANT CHANGE UP
PROTHROM AB SERPL-ACNC: 11.6 SEC — SIGNIFICANT CHANGE UP (ref 10.6–13.6)
RBC # BLD: 5.39 M/UL — SIGNIFICANT CHANGE UP (ref 4.2–5.8)
RBC # FLD: 13.1 % — SIGNIFICANT CHANGE UP (ref 10.3–14.5)
SODIUM SERPL-SCNC: 141 MMOL/L — SIGNIFICANT CHANGE UP (ref 135–145)
SP GR SPEC: 1 — LOW (ref 1.01–1.02)
SPECIMEN SOURCE: SIGNIFICANT CHANGE UP
UROBILINOGEN FLD QL: NEGATIVE MG/DL — SIGNIFICANT CHANGE UP
WBC # BLD: 5.31 K/UL — SIGNIFICANT CHANGE UP (ref 3.8–10.5)
WBC # FLD AUTO: 5.31 K/UL — SIGNIFICANT CHANGE UP (ref 3.8–10.5)

## 2021-09-10 PROCEDURE — 86901 BLOOD TYPING SEROLOGIC RH(D): CPT

## 2021-09-10 PROCEDURE — 71046 X-RAY EXAM CHEST 2 VIEWS: CPT | Mod: 26

## 2021-09-10 PROCEDURE — 87086 URINE CULTURE/COLONY COUNT: CPT

## 2021-09-10 PROCEDURE — 86850 RBC ANTIBODY SCREEN: CPT

## 2021-09-10 PROCEDURE — 36415 COLL VENOUS BLD VENIPUNCTURE: CPT

## 2021-09-10 PROCEDURE — 86900 BLOOD TYPING SEROLOGIC ABO: CPT

## 2021-09-10 PROCEDURE — 85610 PROTHROMBIN TIME: CPT

## 2021-09-10 PROCEDURE — 85025 COMPLETE CBC W/AUTO DIFF WBC: CPT

## 2021-09-10 PROCEDURE — 81003 URINALYSIS AUTO W/O SCOPE: CPT

## 2021-09-10 PROCEDURE — 93010 ELECTROCARDIOGRAM REPORT: CPT

## 2021-09-10 PROCEDURE — 71046 X-RAY EXAM CHEST 2 VIEWS: CPT

## 2021-09-10 PROCEDURE — 85730 THROMBOPLASTIN TIME PARTIAL: CPT

## 2021-09-10 PROCEDURE — G0463: CPT | Mod: 25

## 2021-09-10 PROCEDURE — 93005 ELECTROCARDIOGRAM TRACING: CPT

## 2021-09-10 PROCEDURE — 80048 BASIC METABOLIC PNL TOTAL CA: CPT

## 2021-09-10 NOTE — H&P PST ADULT - ASSESSMENT
56 year old with right hydrocele noted last year  epididymal cyst ; Pt had surgery May 2021 however hydrocele recurred;  c/o scrotal pain and swelling; he presents to PST for planned right hydrocelectomy and spermatocelectomy     Plan:  1. PST instructions given ; NPO status instructions to be given by ASU   2. Pt instructed to take following meds with sip of water : none  3. Pt instructed to take routine evening medications unless indicated   4. Stop NSAIDS ( Aspirin Alev Motrin Mobic Diclofenac), herbal supplements , MVI , Vitamin fish oil 7 days prior to surgery  unless   directed by surgeon or cardiologist;   5. Medical Optimization  with Dr Aguilar   6. Pt denies covid symptoms shortness of breath fever cough   7. Labs EKG CXR as per surgeon request   8. Pt instructed to self quarantine after Covid test   9. Covid Testing scheduled Pt notified and aware

## 2021-09-10 NOTE — H&P PST ADULT - NSANTHOSAYNRD_GEN_A_CORE
No. THIERRY screening performed.  STOP BANG Legend: 0-2 = LOW Risk; 3-4 = INTERMEDIATE Risk; 5-8 = HIGH Risk

## 2021-09-10 NOTE — H&P PST ADULT - NSICDXPASTMEDICALHX_GEN_ALL_CORE_FT
PAST MEDICAL HISTORY:  COVID-19 vaccine series completed Moderna March 2021    COVID-19 virus infection March 2020    Hydrocele     Left inguinal hernia     Right inguinal hernia

## 2021-09-10 NOTE — H&P PST ADULT - HISTORY OF PRESENT ILLNESS
This is a 44 yo male who presents to [facility] with hx of left inguinal pain, mass, stats it is reducible. He c/o recent hx pain on right - no mass. He presents for lap LIH, possible RIH repair. 56 year old with right hydrocele noted last year  epididymal cyst ; Pt had surgery May 2021 however hydrocele recurred;  c/o scrotal pain and swelling; he presents to PST for planned right hydrocelectomy and spermatocelectomy

## 2021-09-11 DIAGNOSIS — Z01.818 ENCOUNTER FOR OTHER PREPROCEDURAL EXAMINATION: ICD-10-CM

## 2021-09-11 DIAGNOSIS — N50.3 CYST OF EPIDIDYMIS: ICD-10-CM

## 2021-09-11 LAB
CULTURE RESULTS: SIGNIFICANT CHANGE UP
SPECIMEN SOURCE: SIGNIFICANT CHANGE UP

## 2021-09-13 PROBLEM — Z92.29 PERSONAL HISTORY OF OTHER DRUG THERAPY: Chronic | Status: ACTIVE | Noted: 2021-09-10

## 2021-09-13 PROBLEM — K40.90 UNILATERAL INGUINAL HERNIA, WITHOUT OBSTRUCTION OR GANGRENE, NOT SPECIFIED AS RECURRENT: Chronic | Status: ACTIVE | Noted: 2021-09-10

## 2021-09-13 PROBLEM — N43.3 HYDROCELE, UNSPECIFIED: Chronic | Status: ACTIVE | Noted: 2021-09-10

## 2021-09-13 PROBLEM — U07.1 COVID-19: Chronic | Status: ACTIVE | Noted: 2021-09-10

## 2021-09-13 PROBLEM — K40.90 UNILATERAL INGUINAL HERNIA, WITHOUT OBSTRUCTION OR GANGRENE, NOT SPECIFIED AS RECURRENT: Chronic | Status: ACTIVE | Noted: 2021-09-09

## 2021-09-14 ENCOUNTER — EMERGENCY (EMERGENCY)
Facility: HOSPITAL | Age: 56
LOS: 0 days | Discharge: HOME | End: 2021-09-14
Attending: EMERGENCY MEDICINE
Payer: OTHER MISCELLANEOUS

## 2021-09-14 VITALS
TEMPERATURE: 98 F | HEART RATE: 83 BPM | OXYGEN SATURATION: 95 % | RESPIRATION RATE: 17 BRPM | SYSTOLIC BLOOD PRESSURE: 120 MMHG | DIASTOLIC BLOOD PRESSURE: 85 MMHG

## 2021-09-14 VITALS — HEIGHT: 65 IN

## 2021-09-14 DIAGNOSIS — R30.0 DYSURIA: ICD-10-CM

## 2021-09-14 DIAGNOSIS — Z20.822 CONTACT WITH AND (SUSPECTED) EXPOSURE TO COVID-19: ICD-10-CM

## 2021-09-14 DIAGNOSIS — Z98.890 OTHER SPECIFIED POSTPROCEDURAL STATES: Chronic | ICD-10-CM

## 2021-09-14 DIAGNOSIS — N50.3 CYST OF EPIDIDYMIS: ICD-10-CM

## 2021-09-14 DIAGNOSIS — Z87.19 PERSONAL HISTORY OF OTHER DISEASES OF THE DIGESTIVE SYSTEM: ICD-10-CM

## 2021-09-14 DIAGNOSIS — N50.811 RIGHT TESTICULAR PAIN: ICD-10-CM

## 2021-09-14 DIAGNOSIS — R10.31 RIGHT LOWER QUADRANT PAIN: ICD-10-CM

## 2021-09-14 LAB
ALBUMIN SERPL ELPH-MCNC: 4.1 G/DL — SIGNIFICANT CHANGE UP (ref 3.3–5)
ALP SERPL-CCNC: 76 U/L — SIGNIFICANT CHANGE UP (ref 40–120)
ALT FLD-CCNC: 54 U/L — SIGNIFICANT CHANGE UP (ref 12–78)
ANION GAP SERPL CALC-SCNC: 6 MMOL/L — SIGNIFICANT CHANGE UP (ref 5–17)
APPEARANCE UR: CLEAR — SIGNIFICANT CHANGE UP
APTT BLD: 32 SEC — SIGNIFICANT CHANGE UP (ref 27.5–35.5)
AST SERPL-CCNC: 21 U/L — SIGNIFICANT CHANGE UP (ref 15–37)
BASOPHILS # BLD AUTO: 0.01 K/UL — SIGNIFICANT CHANGE UP (ref 0–0.2)
BASOPHILS NFR BLD AUTO: 0.2 % — SIGNIFICANT CHANGE UP (ref 0–2)
BILIRUB SERPL-MCNC: 0.4 MG/DL — SIGNIFICANT CHANGE UP (ref 0.2–1.2)
BILIRUB UR-MCNC: NEGATIVE — SIGNIFICANT CHANGE UP
BUN SERPL-MCNC: 12 MG/DL — SIGNIFICANT CHANGE UP (ref 7–23)
CALCIUM SERPL-MCNC: 9.1 MG/DL — SIGNIFICANT CHANGE UP (ref 8.5–10.1)
CHLORIDE SERPL-SCNC: 105 MMOL/L — SIGNIFICANT CHANGE UP (ref 96–108)
CO2 SERPL-SCNC: 26 MMOL/L — SIGNIFICANT CHANGE UP (ref 22–31)
COLOR SPEC: YELLOW — SIGNIFICANT CHANGE UP
CREAT SERPL-MCNC: 1.15 MG/DL — SIGNIFICANT CHANGE UP (ref 0.5–1.3)
DIFF PNL FLD: NEGATIVE — SIGNIFICANT CHANGE UP
EOSINOPHIL # BLD AUTO: 0.01 K/UL — SIGNIFICANT CHANGE UP (ref 0–0.5)
EOSINOPHIL NFR BLD AUTO: 0.2 % — SIGNIFICANT CHANGE UP (ref 0–6)
GLUCOSE SERPL-MCNC: 133 MG/DL — HIGH (ref 70–99)
GLUCOSE UR QL: NEGATIVE MG/DL — SIGNIFICANT CHANGE UP
HCT VFR BLD CALC: 45.4 % — SIGNIFICANT CHANGE UP (ref 39–50)
HGB BLD-MCNC: 14.9 G/DL — SIGNIFICANT CHANGE UP (ref 13–17)
IMM GRANULOCYTES NFR BLD AUTO: 0.2 % — SIGNIFICANT CHANGE UP (ref 0–1.5)
INR BLD: 1.11 RATIO — SIGNIFICANT CHANGE UP (ref 0.88–1.16)
KETONES UR-MCNC: NEGATIVE — SIGNIFICANT CHANGE UP
LACTATE SERPL-SCNC: 1.8 MMOL/L — SIGNIFICANT CHANGE UP (ref 0.7–2)
LEUKOCYTE ESTERASE UR-ACNC: NEGATIVE — SIGNIFICANT CHANGE UP
LIDOCAIN IGE QN: 90 U/L — SIGNIFICANT CHANGE UP (ref 73–393)
LYMPHOCYTES # BLD AUTO: 1.22 K/UL — SIGNIFICANT CHANGE UP (ref 1–3.3)
LYMPHOCYTES # BLD AUTO: 21.8 % — SIGNIFICANT CHANGE UP (ref 13–44)
MCHC RBC-ENTMCNC: 27.2 PG — SIGNIFICANT CHANGE UP (ref 27–34)
MCHC RBC-ENTMCNC: 32.8 GM/DL — SIGNIFICANT CHANGE UP (ref 32–36)
MCV RBC AUTO: 82.8 FL — SIGNIFICANT CHANGE UP (ref 80–100)
MONOCYTES # BLD AUTO: 0.29 K/UL — SIGNIFICANT CHANGE UP (ref 0–0.9)
MONOCYTES NFR BLD AUTO: 5.2 % — SIGNIFICANT CHANGE UP (ref 2–14)
NEUTROPHILS # BLD AUTO: 4.06 K/UL — SIGNIFICANT CHANGE UP (ref 1.8–7.4)
NEUTROPHILS NFR BLD AUTO: 72.4 % — SIGNIFICANT CHANGE UP (ref 43–77)
NITRITE UR-MCNC: NEGATIVE — SIGNIFICANT CHANGE UP
PH UR: 6.5 — SIGNIFICANT CHANGE UP (ref 5–8)
PLATELET # BLD AUTO: 379 K/UL — SIGNIFICANT CHANGE UP (ref 150–400)
POTASSIUM SERPL-MCNC: 3.5 MMOL/L — SIGNIFICANT CHANGE UP (ref 3.5–5.3)
POTASSIUM SERPL-SCNC: 3.5 MMOL/L — SIGNIFICANT CHANGE UP (ref 3.5–5.3)
PROT SERPL-MCNC: 7.5 GM/DL — SIGNIFICANT CHANGE UP (ref 6–8.3)
PROT UR-MCNC: NEGATIVE MG/DL — SIGNIFICANT CHANGE UP
PROTHROM AB SERPL-ACNC: 12.9 SEC — SIGNIFICANT CHANGE UP (ref 10.6–13.6)
RBC # BLD: 5.48 M/UL — SIGNIFICANT CHANGE UP (ref 4.2–5.8)
RBC # FLD: 12.5 % — SIGNIFICANT CHANGE UP (ref 10.3–14.5)
SARS-COV-2 RNA SPEC QL NAA+PROBE: SIGNIFICANT CHANGE UP
SODIUM SERPL-SCNC: 137 MMOL/L — SIGNIFICANT CHANGE UP (ref 135–145)
SP GR SPEC: 1.01 — SIGNIFICANT CHANGE UP (ref 1.01–1.02)
UROBILINOGEN FLD QL: NEGATIVE MG/DL — SIGNIFICANT CHANGE UP
WBC # BLD: 5.6 K/UL — SIGNIFICANT CHANGE UP (ref 3.8–10.5)
WBC # FLD AUTO: 5.6 K/UL — SIGNIFICANT CHANGE UP (ref 3.8–10.5)

## 2021-09-14 PROCEDURE — 93010 ELECTROCARDIOGRAM REPORT: CPT

## 2021-09-14 PROCEDURE — 86900 BLOOD TYPING SEROLOGIC ABO: CPT

## 2021-09-14 PROCEDURE — 85025 COMPLETE CBC W/AUTO DIFF WBC: CPT

## 2021-09-14 PROCEDURE — 36415 COLL VENOUS BLD VENIPUNCTURE: CPT

## 2021-09-14 PROCEDURE — U0003: CPT

## 2021-09-14 PROCEDURE — 86901 BLOOD TYPING SEROLOGIC RH(D): CPT

## 2021-09-14 PROCEDURE — 83605 ASSAY OF LACTIC ACID: CPT

## 2021-09-14 PROCEDURE — 99284 EMERGENCY DEPT VISIT MOD MDM: CPT | Mod: 25

## 2021-09-14 PROCEDURE — 85610 PROTHROMBIN TIME: CPT

## 2021-09-14 PROCEDURE — 99285 EMERGENCY DEPT VISIT HI MDM: CPT

## 2021-09-14 PROCEDURE — 76870 US EXAM SCROTUM: CPT | Mod: 26

## 2021-09-14 PROCEDURE — 93005 ELECTROCARDIOGRAM TRACING: CPT

## 2021-09-14 PROCEDURE — 81003 URINALYSIS AUTO W/O SCOPE: CPT

## 2021-09-14 PROCEDURE — U0005: CPT

## 2021-09-14 PROCEDURE — 87086 URINE CULTURE/COLONY COUNT: CPT

## 2021-09-14 PROCEDURE — 86850 RBC ANTIBODY SCREEN: CPT

## 2021-09-14 PROCEDURE — 76870 US EXAM SCROTUM: CPT

## 2021-09-14 PROCEDURE — 83690 ASSAY OF LIPASE: CPT

## 2021-09-14 PROCEDURE — 85730 THROMBOPLASTIN TIME PARTIAL: CPT

## 2021-09-14 PROCEDURE — 80053 COMPREHEN METABOLIC PANEL: CPT

## 2021-09-14 RX ORDER — MORPHINE SULFATE 50 MG/1
4 CAPSULE, EXTENDED RELEASE ORAL ONCE
Refills: 0 | Status: DISCONTINUED | OUTPATIENT
Start: 2021-09-14 | End: 2021-09-14

## 2021-09-14 RX ORDER — ACETAMINOPHEN 500 MG
1000 TABLET ORAL ONCE
Refills: 0 | Status: COMPLETED | OUTPATIENT
Start: 2021-09-14 | End: 2021-09-14

## 2021-09-14 RX ORDER — SODIUM CHLORIDE 9 MG/ML
1000 INJECTION INTRAMUSCULAR; INTRAVENOUS; SUBCUTANEOUS ONCE
Refills: 0 | Status: COMPLETED | OUTPATIENT
Start: 2021-09-14 | End: 2021-09-14

## 2021-09-14 RX ADMIN — Medication 1000 MILLIGRAM(S): at 10:08

## 2021-09-14 RX ADMIN — SODIUM CHLORIDE 1000 MILLILITER(S): 9 INJECTION INTRAMUSCULAR; INTRAVENOUS; SUBCUTANEOUS at 09:00

## 2021-09-14 RX ADMIN — Medication 1000 MILLIGRAM(S): at 09:00

## 2021-09-14 NOTE — CONSULT NOTE ADULT - SUBJECTIVE AND OBJECTIVE BOX
HPI: 55 yo male with right scrotal pain and right inguinal pain x 4 months presented to the ER with c/o right scrotal swelling and inguinal pain. Patient is known to Dr. Parekh and has hx of right hydrocele and epididymal cyst last year and is s/p hydrocelectomy in May 2021 but his hydrocele recurred and he is currently scheduled for right hydrocelectomy and spermatocelectomy on  by Dr. Parekh. He presented to the ER this am with continued right scrotal pain/ discomfort and some dysuria. Patient tool advil and oxycodone with some relief. He denies any n/v, worsening pain, fevers, chills, hematuria, urinary frequency, urgency or retention. UCx from 9/10/21 neg.          PAST MEDICAL & SURGICAL HISTORY:  Right inguinal hernia    Left inguinal hernia    Hydrocele    COVID-19 vaccine series completed  Moderna 2021    COVID-19 virus infection  2020    H/O inguinal hernia repair      History of hydrocelectomy  2021        REVIEW OF SYSTEMS      All other review of systems neg, except as noted in HPI       MEDICATIONS  (PRN):      Allergies    No Known Allergies    Intolerances        SOCIAL HISTORY:    FAMILY HISTORY:  No pertinent family history in first degree relatives        Vital Signs Last 24 Hrs  T(C): 36.9 (14 Sep 2021 10:55), Max: 36.9 (14 Sep 2021 10:55)  T(F): 98.4 (14 Sep 2021 10:55), Max: 98.4 (14 Sep 2021 10:55)  HR: 83 (14 Sep 2021 10:55) (83 - 122)  BP: 120/85 (14 Sep 2021 10:55) (120/85 - 148/87)  BP(mean): 96 (14 Sep 2021 10:55) (96 - 101)  RR: 17 (14 Sep 2021 10:55) (17 - 18)  SpO2: 95% (14 Sep 2021 10:55) (95% - 100%)    PHYSICAL EXAM:  General: No distress, No anxiety  VITALS  T(C): 36.9 (21 @ 10:55), Max: 36.9 (21 @ 10:55)  HR: 83 (21 @ 10:55) (83 - 122)  BP: 120/85 (21 @ 10:55) (120/85 - 148/87)  RR: 17 (21 @ 10:55) (17 - 18)  SpO2: 95% (21 @ 10:55) (95% - 100%)            Skin     : No jaundice, No lesions, No swelling  HEENT: Normocephalic, no icterus , EOM full , No epistaxis  Lung    : No resp distress  Abdo:   : Soft, Non tender, No guarding, No distension   Back    : No CVAT b/l  Extremity: No calf tenderness   Genitalia Male: No Shelley, mild right scrotal fullness, No testicular swelling. No erythema, No crepitation, No induration, No fluctuance   Neuro   : A&Ox3          LABS:                        14.9   5.60  )-----------( 379      ( 14 Sep 2021 08:49 )             45.4         137  |  105  |  12  ----------------------------<  133<H>  3.5   |  26  |  1.15    Ca    9.1      14 Sep 2021 08:49    TPro  7.5  /  Alb  4.1  /  TBili  0.4  /  DBili  x   /  AST  21  /  ALT  54  /  AlkPhos  76      PT/INR - ( 14 Sep 2021 08:49 )   PT: 12.9 sec;   INR: 1.11 ratio         PTT - ( 14 Sep 2021 08:49 )  PTT:32.0 sec  Urinalysis Basic - ( 14 Sep 2021 08:49 )    Color: Yellow / Appearance: Clear / S.010 / pH: x  Gluc: x / Ketone: Negative  / Bili: Negative / Urobili: Negative mg/dL   Blood: x / Protein: Negative mg/dL / Nitrite: Negative   Leuk Esterase: Negative / RBC: x / WBC x   Sq Epi: x / Non Sq Epi: x / Bacteria: x        RADIOLOGY & ADDITIONAL STUDIES:  < from: US Testicles (21 @ 09:51) >  EXAM:  US SCROTUM AND CONTENTS                            PROCEDURE DATE:  2021          INTERPRETATION:  CLINICAL INFORMATION: Right testicular and groin pain    COMPARISON: Ultrasound scrotum 2021    TECHNIQUE: Testicular ultrasound utilizing color and spectral Doppler.    FINDINGS:    RIGHT:  Right testis: 2.8 cm x 4.3 cm x  3.2 cm. Normal echogenicity and echotexture with no masses or areas of architectural distortion. Normal arterial and venous blood flow pattern.  Right epididymis: Cysts measuring up to 5.3 cm  Right hydrocele: Moderate  Right varicocele: None.    LEFT:  Left testis: 3.1 cm x 4.3 cm x 2.2 cm. Normal echogenicity and echotexture with no masses or areas of architectural distortion. Normal arterial and venous blood flow pattern.  Left epididymis: Cysts measuring up to 1.4 cm.  Left hydrocele: Small  Left varicocele: None.    IMPRESSION:    No acute scrotal pathology.  Enlarging 5.3 cm right epididymal cyst      --- End of Report ---            LAKHWINDERGINA CUI MD; Attending Radiologist  This document has been electronically signed. Sep 14 2021 10:33AM    < end of copied text >

## 2021-09-14 NOTE — ED PROVIDER NOTE - PATIENT PORTAL LINK FT
You can access the FollowMyHealth Patient Portal offered by [facility] by registering at the following website: [email or web address]. By joining [employer]’s FollowMyHealth portal, you will also be able to view your health information using other applications (apps) compatible with our system.

## 2021-09-14 NOTE — ED PROVIDER NOTE - CARE PROVIDER_API CALL
Rian Parekh)  Urology  [address], [address] [place] [address]  Phone: [phone]  Fax: [phone]  Follow Up Time:

## 2021-09-14 NOTE — ED PROVIDER NOTE - NSFOLLOWUPINSTRUCTIONS_ED_ALL_ED_FT
FOLLOW UP WITH PMD & UROLOGIST DR CHIN  WITHIN 1-2DAYS, CALL TO MAKE APPOINTMENT  COME BACK TO ED IF YOUR CONDITION WORSENS OR IF YOU DEVELOP FEVER GREATER THAN 100.4F, CHEST PAIN,  SHORTNESS OF BREATH OR ANY OTHER SYMPTOMS CONCERNING TO YOU  TAKE TYLENOL (ACETAMINOPHEN) 650 MG EVERY 6 HOURS AS NEEDED FOR PAIN

## 2021-09-14 NOTE — ED PROVIDER NOTE - EKG ADDITIONAL QUESTION - PERFORMED INDEPENDENT VISUALIZATION
[address] [facility] Discharge Follow-Up        Patient discharged from [facility] for Chest pain. Panel Manager Yes

## 2021-09-14 NOTE — ED PROVIDER NOTE - CLINICAL SUMMARY MEDICAL DECISION MAKING FREE TEXT BOX
R testicle & groin pain x 4 months, acute on chronic. also with dysuria & scrotal swelling. will eval for torsion & uti. had right hydrocele & epididymal cyst noted last year  s/p surgery May 2021 however hydrocele recurred;  planned right hydrocelectomy and spermatocelectomy on 9/20. taking advil & oxycodone with only mild relief. will discuss dispo with urology

## 2021-09-14 NOTE — ED ADULT NURSE NOTE - OBJECTIVE STATEMENT
Pt. reports to ED for right groin and testicular pain.  Pt. reports after hydrocele surgery 4 months ago, pain has worsened radiating to his back.  pt. recently Pt. reports to ED for right groin and testicular pain.  Pt. reports after hydrocele surgery 4 months ago, pain has worsened radiating to his back. Reports pain 7/10.  c/o painful urination and swollen right testicle, denies nausea and vomiting. Pt. taking advil and oxycodone at home with mild relief.  pt. recently in ED for same complaints and discharged due to planned surgery 9/20 with Dr. Parekh. VSS. Labs and urine sent.  Tylenol given. Pt. in bed resting comfortably at this time.

## 2021-09-14 NOTE — ED PROVIDER NOTE - NS ED ROS FT
Review of Systems:  	•	CONSTITUTIONAL: no fever  	•	SKIN: no rash  	•	RESPIRATORY: no shortness of breath  	•	CARDIAC: no chest pain  	•	GI:  no abd pain, no nausea, no vomiting, no diarrhea  	•	GENITO-URINARY:  dysuria, testicle pain  	•	MUSCULOSKELETAL:  no back pain  	•	NEUROLOGIC: no weakness  	•	ALLERGY: no rhinorrhea  	•	PSYSCHIATRIC: appropriate concern about symptoms

## 2021-09-14 NOTE — ED PROVIDER NOTE - PHYSICAL EXAMINATION
*GEN: No acute distress, well appearing   *HEAD: Normocephalic, Atraumatic  *EYES/NOSE: b/l Pupils symmetric & Reactive to ligth, EOMI b/l  *THROAT: airway patent, moist mucous membranes  *NECK: Neck supple  *PULMONARY: No Respiratory distress, symmetric b/l chest rise  *CARDIAC: s1s2, regular rhythm   *ABDOMEN:  Non Tender, Non Distended, soft, no guarding, no rebound, no masses   *BACK: no CVA tenderness, No midline vertebral tenderness to palpation   *: (chaperone by __DEYVI HALL__) uncircumcised, R testicular mass with swelling,  no abnormal lie, cremastaric intact b/l; no rash;   *EXTREMITIES: symmetric pulses, 2+ DP & radial pulses, no cyanosis, no edema   *SKIN: no rash, no bruising   *NEUROLOGIC: alert,  full active & passive ROM in all 4 extremities,   *PSYCH: appropriate concern about symptoms, pleasant

## 2021-09-14 NOTE — ED PROVIDER NOTE - PROGRESS NOTE DETAILS
santo: PT seen and reassessed.  Patient symptomatically improved.  Wants to be discharged AAOX3, NAD, VSS.  Discussed test results w/ patient. Patient verbalized understanding of hospital course and outpatient plans, has decisional making capacity.  Will f/u w/ pmd in the next few days; patient will call for an appointment. Will return to the ED if there is any worsening of symptoms.  Patient able to ambulate at baseline, is tolerating PO intake. Has safe way of getting home. santo: urology edouard [place] saw pt & rec's no acute intervetnion.

## 2021-09-14 NOTE — ED ADULT NURSE NOTE - NSIMPLEMENTINTERV_GEN_ALL_ED
Implemented All Universal Safety Interventions:  [place] to call system. Call bell, personal items and telephone within reach. Instruct patient to call for assistance. Room bathroom lighting operational. Non-slip footwear when patient is off stretcher. Physically safe environment: no spills, clutter or unnecessary equipment. Stretcher in lowest position, wheels locked, appropriate side rails in place.

## 2021-09-14 NOTE — ED ADULT TRIAGE NOTE - CHIEF COMPLAINT QUOTE
PT presents to ed ambulatory for evaluation of right testicular/ groin pain since hernia repair surgery in may. pt reports being evaluated in ed recently and dc home. pt reports severe pain and burning on urination, no other complaints

## 2021-09-14 NOTE — ED PROVIDER NOTE - OBJECTIVE STATEMENT
Pertinent HPI/PMH/PSH/FHx/SHx and Review of Systems contained within  HPI:  Patient p/w CC  R testicle & groin pain x 4 months, acute on chronic. also with dysuria & scrotal swelling. taking advil & oxycodone with only mild relief. had right hydrocele & epididymal cyst noted last year  s/p surgery May 2021 however hydrocele recurred;  planned right hydrocelectomy and spermatocelectomy on 9/20  PMH/PSH relevant for: inguinal hernia repair 2011  ROS negative for: fever, Chest pain, SOB, Nausea, vomiting, diarrhea, abdominal pain, dysuria    FamilyHx and SocialHx not otherwise contributory

## 2021-09-14 NOTE — CONSULT NOTE ADULT - ASSESSMENT
57 yo male with right scrotal pain and right inguinal pain x 4 months presented to the ER with c/o right scrotal swelling and inguinal pain. Patient is known to Dr. Parekh and has hx of right hydrocele and epididymal cyst last year and is s/p hydrocelectomy in May 2021 but his hydrocele recurred and he is currently scheduled for right hydrocelectomy and spermatocelectomy on 9/20 by Dr. Parekh.   Scrotal US: No acute scrotal pathology. Enlarging 5.3 cm right epididymal cyst  Labs and UA wnl.  Discussed with patient hydrocelectomy/ spermatocelectomy is an elective procedure and currently given his benign scrotal exam and negative scrotal US for torsion there is no emergent indication for uro surgical intervention. Patient verbalized understanding and would like to keep his surgery date for 9/20/21.  Rec  Pain control  F/U urine c/s and treat accordingly  Pt scheduled for surgery on 9/20/21    Case discussed with Dr. Parekh

## 2021-09-15 DIAGNOSIS — Z01.818 ENCOUNTER FOR OTHER PREPROCEDURAL EXAMINATION: ICD-10-CM

## 2021-09-15 LAB
CULTURE RESULTS: NO GROWTH — SIGNIFICANT CHANGE UP
SPECIMEN SOURCE: SIGNIFICANT CHANGE UP

## 2021-09-17 ENCOUNTER — APPOINTMENT (OUTPATIENT)
Dept: DISASTER EMERGENCY | Facility: CLINIC | Age: 56
End: 2021-09-17

## 2021-09-19 LAB — SARS-COV-2 N GENE NPH QL NAA+PROBE: NOT DETECTED

## 2021-09-20 ENCOUNTER — OUTPATIENT (OUTPATIENT)
Dept: INPATIENT UNIT | Facility: HOSPITAL | Age: 56
LOS: 1 days | Discharge: HOME | End: 2021-09-20
Payer: COMMERCIAL

## 2021-09-20 ENCOUNTER — APPOINTMENT (OUTPATIENT)
Dept: UROLOGY | Facility: HOSPITAL | Age: 56
End: 2021-09-20

## 2021-09-20 ENCOUNTER — RESULT REVIEW (OUTPATIENT)
Age: 56
End: 2021-09-20

## 2021-09-20 VITALS
TEMPERATURE: 98 F | HEART RATE: 98 BPM | DIASTOLIC BLOOD PRESSURE: 77 MMHG | RESPIRATION RATE: 16 BRPM | SYSTOLIC BLOOD PRESSURE: 123 MMHG | HEIGHT: 65 IN | WEIGHT: 158.95 LBS | OXYGEN SATURATION: 98 %

## 2021-09-20 VITALS
RESPIRATION RATE: 15 BRPM | TEMPERATURE: 99 F | SYSTOLIC BLOOD PRESSURE: 122 MMHG | HEART RATE: 90 BPM | OXYGEN SATURATION: 98 % | DIASTOLIC BLOOD PRESSURE: 75 MMHG

## 2021-09-20 DIAGNOSIS — N43.3 HYDROCELE, UNSPECIFIED: ICD-10-CM

## 2021-09-20 DIAGNOSIS — Z98.890 OTHER SPECIFIED POSTPROCEDURAL STATES: Chronic | ICD-10-CM

## 2021-09-20 DIAGNOSIS — N50.3 CYST OF EPIDIDYMIS: ICD-10-CM

## 2021-09-20 PROCEDURE — 88302 TISSUE EXAM BY PATHOLOGIST: CPT | Mod: 26

## 2021-09-20 PROCEDURE — 88302 TISSUE EXAM BY PATHOLOGIST: CPT

## 2021-09-20 PROCEDURE — 88304 TISSUE EXAM BY PATHOLOGIST: CPT | Mod: 26

## 2021-09-20 PROCEDURE — 54840 REMOVE EPIDIDYMIS LESION: CPT | Mod: 59,RT

## 2021-09-20 PROCEDURE — 88304 TISSUE EXAM BY PATHOLOGIST: CPT

## 2021-09-20 PROCEDURE — 55040 REMOVAL OF HYDROCELE: CPT

## 2021-09-20 RX ORDER — OXYCODONE HYDROCHLORIDE 5 MG/1
5 TABLET ORAL ONCE
Refills: 0 | Status: DISCONTINUED | OUTPATIENT
Start: 2021-09-20 | End: 2021-09-20

## 2021-09-20 RX ORDER — CEPHALEXIN 500 MG
1 CAPSULE ORAL
Qty: 10 | Refills: 0
Start: 2021-09-20 | End: 2021-09-24

## 2021-09-20 RX ORDER — ONDANSETRON 8 MG/1
4 TABLET, FILM COATED ORAL ONCE
Refills: 0 | Status: DISCONTINUED | OUTPATIENT
Start: 2021-09-20 | End: 2021-09-20

## 2021-09-20 RX ORDER — IBUPROFEN 200 MG
1 TABLET ORAL
Qty: 15 | Refills: 0
Start: 2021-09-20 | End: 2021-09-24

## 2021-09-20 RX ORDER — FENTANYL CITRATE 50 UG/ML
25 INJECTION INTRAVENOUS
Refills: 0 | Status: DISCONTINUED | OUTPATIENT
Start: 2021-09-20 | End: 2021-09-20

## 2021-09-20 NOTE — ASU PATIENT PROFILE, ADULT - PATIENT REPRESENTATIVE: ( YOU CAN CHOOSE ANY PERSON THAT CAN ASSIST YOU WITH YOUR HEALTH CARE PREFERENCES, DOES NOT HAVE TO BE A SPOUSE, IMMEDIATE FAMILY OR SIGNIFICANT OTHER/PARTNER)
IM Residency Progress Note   Unit/Bed#: [facility] 726-01 Encounter: [id number]  SOD Team C       Nash Guzman [de-identified] y o  female [id number]    Hospital Stay Days: 3      Assessment/Plan:    Principal Problem:    CVA (cerebral vascular accident) [facility])  Active Problems:    Essential hypertension    Coronary artery disease involving native coronary artery of native heart with angina pectoris ([facility] )    Type 2 diabetes mellitus with complication, with long-term current use of insulin (HCC)    Hyperlipidemia    Gastroesophageal reflux disease without esophagitis    Moderate mitral stenosis    LEONARD (acute kidney injury) ([facility] )    Asymptomatic carotid artery stenosis, bilateral    Atherosclerosis of artery of extremity with ulceration (HCC)    Hx of CABG    Migraine headache    Paroxysmal atrial fibrillation (HCC)    Ulcer of toe of left foot (HCC)    Ulcer of toe of right foot (HCC)    Left bundle branch block (LBBB)    1st degree AV block    S/P TAVR (transcatheter aortic valve replacement)    Expressive aphasia      CVA  -MRI with several small, likely embolic regions  -Patient is not below therapeutic range of INR on coumadin   -Neuro checks Q4H  -Will not use stroke pathway due to subacute presentation  -Continue ASA, high intensity statin  -ROBB with no thrombus, no change in valvular pathology   -Neurology following      Elevated troponin  -Significance unclear given recent cardiac procedure and tachycardia with known CAD  -Clinically stable    -Continue to monitor with telemetry      Acute kidney injury  -Likely prerenal with volume contraction on exam   -Improved with NS  -Hold ACEI  -Trend Cr      Chronic heart failure with AS s/p TAVR  -Patient on 40mg BID furosemide at home  -Appears volume contracted on exam, likely iatrogenic  -Consult cardiology      IDDM2  -Diabetic diet   -On 30u BID basal insulin with fair control at home at 7 5%  -Due to hypoglycemia decrease to 15u BID basal insulin with QID fingerstick  -Hold metformin with LEONARD  -Goal blood sugar < 180 in hospital      CAD s/p CABG  -Continue ASA, high intensity statin     Atrial fibrillation/flutter  -Continue rate control strategy and anticoagulation with coumadin      Asthma  -Continue inhaled steroid  -beta agonist therapy PRN  PVD with LE wounds  -Wounds with scab on BL toes  -No sign of active infection  -Discussed with patient role of continued ambulation in maintaining blood flow to feet  -Formal follow up with vascular surgery scheduled  Disposition: Transition of anticoagulation  Optimization for discharge  Subjective:   Patient has no complaints this morning  She has no weakness, no headache, no fever or chills  In regards to her feet, she states that the toes are routinely painful  Vitals: Temp (24hrs), Av 4 °F (36 9 °C), Min:97 9 °F (36 6 °C), Max:98 9 °F (37 2 °C)  Current: Temperature: 97 9 °F (36 6 °C)  Vitals:    18 1853 18 2205 18 2321 18 0300   BP: 144/72 121/51 146/78 137/82   BP Location: Left arm  Left arm Left arm   Pulse: 87 72 70 79   Resp:  18   Temp: 98 9 °F (37 2 °C)  98 8 °F (37 1 °C) 97 9 °F (36 6 °C)   TempSrc: Oral  Oral Oral   SpO2: 99%  94% 95%   Weight:       Height:        Body mass index is 28 84 kg/m²  I/O last 24 hours: In: 605 [P O :405; I V :200]  Out: -       Physical Exam:   Gen: In chair, NAD  HEENT: Membranes moist   CV:Irregular rhythm  Extr: Distal feet with rubor, areas of scabbing on digits  Neuro: CN II-XII intact, strength full and equal   Noted expressive aphasia      Invasive Devices     Peripheral Intravenous Line            Peripheral IV 18 Left Forearm 3 days                      Labs:   Recent Results (from the past 24 hour(s))   Fingerstick Glucose (POCT)    Collection Time: 18  7:45 AM   Result Value Ref Range    POC Glucose 104 65 - 140 mg/dl   CBC    Collection Time: 18 10:43 AM   Result Value Ref Range    WBC 5 92 4 31 - 10 16 Thousand/uL    RBC 4 91 3 81 - 5 12 Million/uL    Hemoglobin 10 4 (L) 11 5 - 15 4 g/dL    Hematocrit 33 8 (L) 34 8 - 46 1 %    MCV 69 (L) 82 - 98 fL    MCH 21 2 (L) 26 8 - 34 3 pg    MCHC 30 8 (L) 31 4 - 37 4 g/dL    RDW 19 2 (H) 11 6 - 15 1 %    Platelets 589 874 - 153 Thousands/uL   Fingerstick Glucose (POCT)    Collection Time: 04/18/18 10:59 AM   Result Value Ref Range    POC Glucose 70 65 - 140 mg/dl   Fingerstick Glucose (POCT)    Collection Time: 04/18/18 11:52 AM   Result Value Ref Range    POC Glucose 78 65 - 140 mg/dl   Fingerstick Glucose (POCT)    Collection Time: 04/18/18  4:26 PM   Result Value Ref Range    POC Glucose 183 (H) 65 - 140 mg/dl   Fingerstick Glucose (POCT)    Collection Time: 04/18/18  8:46 PM   Result Value Ref Range    POC Glucose 256 (H) 65 - 140 mg/dl   Basic metabolic panel    Collection Time: 04/19/18  5:03 AM   Result Value Ref Range    Sodium 144 136 - 145 mmol/L    Potassium 3 9 3 5 - 5 3 mmol/L    Chloride 113 (H) 100 - 108 mmol/L    CO2 23 21 - 32 mmol/L    Anion Gap 8 4 - 13 mmol/L    BUN 17 5 - 25 mg/dL    Creatinine 0 87 0 60 - 1 30 mg/dL    Glucose 76 65 - 140 mg/dL    Calcium 6 4 (L) 8 3 - 10 1 mg/dL    eGFR 63 ml/min/1 73sq m   CBC    Collection Time: 04/19/18  5:03 AM   Result Value Ref Range    WBC 5 30 4 31 - 10 16 Thousand/uL    RBC 4 35 3 81 - 5 12 Million/uL    Hemoglobin 9 3 (L) 11 5 - 15 4 g/dL    Hematocrit 30 7 (L) 34 8 - 46 1 %    MCV 71 (L) 82 - 98 fL    MCH 21 4 (L) 26 8 - 34 3 pg    MCHC 30 3 (L) 31 4 - 37 4 g/dL    RDW 19 1 (H) 11 6 - 15 1 %    Platelets 905 490 - 745 Thousands/uL   Fingerstick Glucose (POCT)    Collection Time: 04/19/18  6:29 AM   Result Value Ref Range    POC Glucose 77 65 - 140 mg/dl       Radiology Results: I have personally reviewed pertinent reports  and I have personally reviewed pertinent films in PACS  Xr Chest 2 Views    Result Date: 4/16/2018  Impression: No acute cardiopulmonary disease   Workstation performed: [id number] Ct Head Without Contrast    Result Date: 4/16/2018  Impression: No acute intracranial abnormality  Age related atrophy and findings most compatible with chronic small vessel ischemic disease  Workstation performed: [id number]     Mri Brain Wo Contrast    Result Date: 4/17/2018  Impression: 1   4 punctate foci of diffusion restriction indicative of scattered tiny acute/recent lacunar infarctions in 4 distinct vascular territories implicating a central embolic source  Does the patient have atrial fibrillation? 2  Advanced, chronic microangiopathy   Workstation performed: [id number]       Active Meds:   Current Facility-Administered Medications   Medication Dose Route Frequency    acetaminophen (TYLENOL) tablet 650 mg  650 mg Oral Q6H PRN    aspirin chewable tablet 81 mg  81 mg Oral Daily    atorvastatin (LIPITOR) tablet 80 mg  80 mg Oral Daily    cholecalciferol (VITAMIN D3) tablet 1,000 Units  1,000 Units Oral Daily    docusate sodium (COLACE) capsule 100 mg  100 mg Oral BID PRN    fluticasone (FLOVENT HFA) 110 MCG/ACT inhaler 1 puff  1 puff Inhalation Q12H [address]    insulin glargine (LANTUS) subcutaneous injection 15 Units  15 Units Subcutaneous Daily With Breakfast    insulin glargine (LANTUS) subcutaneous injection 15 Units  15 Units Subcutaneous HS    levothyroxine tablet 100 mcg  100 mcg Oral Early Morning    metoprolol tartrate (LOPRESSOR) tablet 100 mg  100 mg Oral Q12H [address]    warfarin (COUMADIN) tablet 3 mg  3 mg Oral Daily (warfarin)         VTE Pharmacologic Prophylaxis: anticoagulated on coumadin  VTE Mechanical Prophylaxis: sequential compression device    Linden Sailors Declines

## 2021-09-20 NOTE — BRIEF OPERATIVE NOTE - NSICDXBRIEFPOSTOP_GEN_ALL_CORE_FT
POST-OP DIAGNOSIS:  Right hydrocele 20-Sep-2021 14:22:09  Rian Parekh  Spermatocele 20-Sep-2021 14:22:14  Rian Parekh

## 2021-09-20 NOTE — ASU DISCHARGE PLAN (ADULT/PEDIATRIC) - ASU DC SPECIAL INSTRUCTIONSFT
Change dressing as needed.   Bacitracin ointment to incision twice a day for next 1 week.   Continue to wear scrotal support.   Ice pack 10 minutes on and 10 minutes off as needed.

## 2021-09-20 NOTE — BRIEF OPERATIVE NOTE - NSICDXBRIEFPREOP_GEN_ALL_CORE_FT
PRE-OP DIAGNOSIS:  Right hydrocele 20-Sep-2021 14:21:33  Rian Parekh  Spermatocele 20-Sep-2021 14:22:02  Rian Parekh

## 2021-09-20 NOTE — ASU DISCHARGE PLAN (ADULT/PEDIATRIC) - NURSING INSTRUCTIONS
Begin with liquids and light food ( tea, toast, Jello, soups). Advance to what you normally eat. Liquids should taken in adequate amounts today.Refer to multi color post op discharge instruction sheet     CALL the DOCTOR:    -Fever greater than  101F  - Signs  of infection such as : increase pain,swelling,redness,or a bad  smell coming from the wound.  -Excessive amount of bleeding.  - Any pain that appears to be getting worse.  - Vomiting  -  If you have  not urinated 8 hours after surgery or have any difficulty urinating.     A responsible adult should be with you for the rest of the day and night for your safety and to help you if you needed.    Review attached FACT SHEET if applicable. Review home care instructions For any problems or concerns,contact your doctor. [facility] patients should call the [facility]. If you cannot reach the doctor or clinic, call [facility] Emergency Department at [phone] or go to your local Emergency Department.  A responsible adult should be with you for the rest of the day and night for your safety and to help you if you needed. Resume your medications as listed on the attached Medication Record.

## 2021-09-20 NOTE — ASU DISCHARGE PLAN (ADULT/PEDIATRIC) - CARE PROVIDER_API CALL
Rian Parekh)  Urology  [address], [address] [place] [address]  Phone: [phone]  Fax: [phone]  Follow Up Time: 2 weeks

## 2021-09-20 NOTE — ASU PREOP CHECKLIST - HEIGHT IN INCHES
5 Nsaids Pregnancy And Lactation Text: These medications are considered safe up to 30 weeks gestation. It is excreted in breast milk.

## 2021-09-28 DIAGNOSIS — N43.40 SPERMATOCELE OF EPIDIDYMIS, UNSPECIFIED: ICD-10-CM

## 2021-09-28 DIAGNOSIS — N43.3 HYDROCELE, UNSPECIFIED: ICD-10-CM

## 2021-09-28 DIAGNOSIS — E78.2 MIXED HYPERLIPIDEMIA: ICD-10-CM

## 2021-09-28 DIAGNOSIS — N50.9 DISORDER OF MALE GENITAL ORGANS, UNSPECIFIED: ICD-10-CM

## 2021-09-29 ENCOUNTER — APPOINTMENT (OUTPATIENT)
Dept: UROLOGY | Facility: CLINIC | Age: 56
End: 2021-09-29
Payer: COMMERCIAL

## 2021-09-29 PROCEDURE — 99024 POSTOP FOLLOW-UP VISIT: CPT

## 2021-10-06 NOTE — HISTORY OF PRESENT ILLNESS
[FreeTextEntry1] : 57 yo male presents for follow up. \par Still has discomfort around right testis, not like before surgery. \par \par Status post right hydrocelectomy and spermatocelectomy, done 9/20/21 at [facility].\par \par \par Initially seen for Hydrocele. \par Had right inguinal hernia surgery With Dr Bear at [facility] in May 2021 and since then had developed right scrotal swelling with pain and discomfort. Had been getting bigger and caused more pain. \par Was at [facility] and had CT scan and Scrotal Ultrasound. \par Had seen Dr Wolf who recommended observation. \par Denied any difficulty with urination. \par Denied dysuria, hematuria, lower abdominal or flank pain, nausea, vomiting, fever, chills or rigors.

## 2021-10-06 NOTE — PHYSICAL EXAM
[Normal Appearance] : normal appearance [General Appearance - In No Acute Distress] : no acute distress [FreeTextEntry1] : Incision: clean, minimal erythema, no discharge, slight induration around right testis, slight tender to touch  [Skin Color & Pigmentation] : normal skin color and pigmentation [] : no respiratory distress [Oriented To Time, Place, And Person] : oriented to person, place, and time

## 2021-10-06 NOTE — ASSESSMENT
[FreeTextEntry1] : Hydrocele:\par Epididymal cyst:\par Healing well. \par Continue Ibuprofen/Tylenol. \par Continue scrotal support. \par \par Return to office in 2 weeks or sooner if any issues.

## 2021-10-13 ENCOUNTER — APPOINTMENT (OUTPATIENT)
Dept: UROLOGY | Facility: CLINIC | Age: 56
End: 2021-10-13
Payer: COMMERCIAL

## 2021-10-13 VITALS
SYSTOLIC BLOOD PRESSURE: 117 MMHG | HEIGHT: 63 IN | BODY MASS INDEX: 32.78 KG/M2 | HEART RATE: 93 BPM | DIASTOLIC BLOOD PRESSURE: 72 MMHG | WEIGHT: 185 LBS | TEMPERATURE: 97.9 F | OXYGEN SATURATION: 99 %

## 2021-10-13 DIAGNOSIS — N50.811 RIGHT TESTICULAR PAIN: ICD-10-CM

## 2021-10-13 DIAGNOSIS — N50.3 CYST OF EPIDIDYMIS: ICD-10-CM

## 2021-10-13 DIAGNOSIS — N43.3 HYDROCELE, UNSPECIFIED: ICD-10-CM

## 2021-10-13 PROCEDURE — 99024 POSTOP FOLLOW-UP VISIT: CPT

## 2021-10-13 RX ORDER — CEPHALEXIN 500 MG/1
500 CAPSULE ORAL
Qty: 10 | Refills: 1 | Status: ACTIVE | COMMUNITY
Start: 2021-10-13 | End: 1900-01-01

## 2021-10-13 NOTE — HISTORY OF PRESENT ILLNESS
[FreeTextEntry1] : 55 yo male presents for follow up. \par Patient primarily Peruvian speaking, with limited English. Visit conducted with help of accompanying Son who was translating. \par Still lot of discomfort around right testis, feels burning and pressure. \par \par Status post right hydrocelectomy and spermatocelectomy, done 9/20/21 at [facility].\par \par Initially seen for Hydrocele. \par Had right inguinal hernia surgery With Dr Bear at [facility] in May 2021 and since then had developed right scrotal swelling with pain and discomfort. Had been getting bigger and caused more pain. \par Was at [facility] and had CT scan and Scrotal Ultrasound. \par Had seen Dr Wolf who recommended observation. \par Denied any difficulty with urination. \par Denied dysuria, hematuria, lower abdominal or flank pain, nausea, vomiting, fever, chills or rigors.

## 2021-10-13 NOTE — PHYSICAL EXAM
[Normal Appearance] : normal appearance [General Appearance - In No Acute Distress] : no acute distress [Skin Color & Pigmentation] : normal skin color and pigmentation [FreeTextEntry1] : normal peripheral circulation  [] : no respiratory distress

## 2021-10-13 NOTE — ASSESSMENT
[FreeTextEntry1] : Right testis pain:\par Epididymal cyst:\par Hydrocele:\par Improved exam.\par Keflex\par Ibuprofen \par Scrotal Ultrasound before follow up appointment \par \par Return to office in 4 weeks or sooner if any issues

## 2021-11-10 ENCOUNTER — APPOINTMENT (OUTPATIENT)
Dept: UROLOGY | Facility: CLINIC | Age: 56
End: 2021-11-10
Payer: COMMERCIAL

## 2021-11-10 DIAGNOSIS — Z98.890 OTHER SPECIFIED POSTPROCEDURAL STATES: ICD-10-CM

## 2021-11-10 DIAGNOSIS — Z87.438 OTHER SPECIFIED POSTPROCEDURAL STATES: ICD-10-CM

## 2021-11-10 DIAGNOSIS — R10.31 RIGHT LOWER QUADRANT PAIN: ICD-10-CM

## 2021-11-10 PROCEDURE — 99024 POSTOP FOLLOW-UP VISIT: CPT

## 2021-11-10 RX ORDER — IBUPROFEN 600 MG/1
600 TABLET, FILM COATED ORAL 3 TIMES DAILY
Qty: 30 | Refills: 0 | Status: ACTIVE | COMMUNITY
Start: 2021-10-13 | End: 1900-01-01

## 2021-11-15 PROBLEM — Z98.890 STATUS POST REPAIR OF HYDROCELE: Status: ACTIVE | Noted: 2021-11-15

## 2021-11-15 PROBLEM — R10.31 RIGHT GROIN PAIN: Status: ACTIVE | Noted: 2021-11-15

## 2021-11-15 NOTE — HISTORY OF PRESENT ILLNESS
[FreeTextEntry1] : 57 yo male presents for right groin pain. \par Patient primarily Pakistani speaking, with limited English. Visit conducted with help of accompanying Son who was translating. \par Still lot of discomfort around right groin towards inguinal area. \par \par Status post right hydrocelectomy and spermatocelectomy, done 9/20/21 at [facility].\par \par Initially seen for Hydrocele. \par Had right inguinal hernia surgery With Dr Bear at [facility] in May 2021 and since then had developed right scrotal swelling with pain and discomfort. Had been getting bigger and caused more pain. \par Was at [facility] and had CT scan and Scrotal Ultrasound. \par Had seen Dr Wolf who recommended observation. \par Denied any difficulty with urination. \par Denied dysuria, hematuria, lower abdominal or flank pain, nausea, vomiting, fever, chills or rigors.

## 2021-11-15 NOTE — ASSESSMENT
[FreeTextEntry1] : Reviewed outside records. \par \par Status post Hydrocele repair:\par Right groin pain:\par Discussed Scrotal Ultrasound and physical exam findings. \par Discussed concern of nerve entrapment in Hernia scar. \par Ibuprofen PRN\par \par Asked to see Gen Surgery/Hernia specialist.\par \par Slight induration around right testis. Inferiorly small nodule.

## 2022-01-11 NOTE — ASU DISCHARGE PLAN (ADULT/PEDIATRIC) - PROCEDURE
Reason for Disposition   Information only question and nurse able to answer    Protocols used: INFORMATION ONLY CALL - NO TRIAGE-ADULT-OH Right Hydrocelectomy and Spermatocelectomy

## 2022-03-20 ENCOUNTER — EMERGENCY (EMERGENCY)
Facility: HOSPITAL | Age: 57
LOS: 1 days | Discharge: HOME | End: 2022-03-20
Attending: EMERGENCY MEDICINE | Admitting: EMERGENCY MEDICINE
Payer: SELF-PAY

## 2022-03-20 VITALS
OXYGEN SATURATION: 97 % | DIASTOLIC BLOOD PRESSURE: 74 MMHG | TEMPERATURE: 98 F | SYSTOLIC BLOOD PRESSURE: 124 MMHG | HEART RATE: 92 BPM | RESPIRATION RATE: 16 BRPM

## 2022-03-20 VITALS
SYSTOLIC BLOOD PRESSURE: 125 MMHG | RESPIRATION RATE: 18 BRPM | DIASTOLIC BLOOD PRESSURE: 80 MMHG | WEIGHT: 158.07 LBS | HEIGHT: 65 IN | OXYGEN SATURATION: 94 % | TEMPERATURE: 98 F | HEART RATE: 112 BPM

## 2022-03-20 DIAGNOSIS — Z98.890 OTHER SPECIFIED POSTPROCEDURAL STATES: Chronic | ICD-10-CM

## 2022-03-20 LAB
ALBUMIN SERPL ELPH-MCNC: 3.7 G/DL — SIGNIFICANT CHANGE UP (ref 3.3–5)
ALP SERPL-CCNC: 86 U/L — SIGNIFICANT CHANGE UP (ref 40–120)
ALT FLD-CCNC: 54 U/L — SIGNIFICANT CHANGE UP (ref 12–78)
ANION GAP SERPL CALC-SCNC: 7 MMOL/L — SIGNIFICANT CHANGE UP (ref 5–17)
AST SERPL-CCNC: 22 U/L — SIGNIFICANT CHANGE UP (ref 15–37)
BASOPHILS # BLD AUTO: 0.01 K/UL — SIGNIFICANT CHANGE UP (ref 0–0.2)
BASOPHILS NFR BLD AUTO: 0.2 % — SIGNIFICANT CHANGE UP (ref 0–2)
BILIRUB SERPL-MCNC: 0.3 MG/DL — SIGNIFICANT CHANGE UP (ref 0.2–1.2)
BUN SERPL-MCNC: 6 MG/DL — LOW (ref 7–23)
CALCIUM SERPL-MCNC: 9 MG/DL — SIGNIFICANT CHANGE UP (ref 8.5–10.1)
CHLORIDE SERPL-SCNC: 111 MMOL/L — HIGH (ref 96–108)
CO2 SERPL-SCNC: 25 MMOL/L — SIGNIFICANT CHANGE UP (ref 22–31)
CREAT SERPL-MCNC: 1.1 MG/DL — SIGNIFICANT CHANGE UP (ref 0.5–1.3)
EGFR: 79 ML/MIN/1.73M2 — SIGNIFICANT CHANGE UP
EOSINOPHIL # BLD AUTO: 0.01 K/UL — SIGNIFICANT CHANGE UP (ref 0–0.5)
EOSINOPHIL NFR BLD AUTO: 0.2 % — SIGNIFICANT CHANGE UP (ref 0–6)
GLUCOSE SERPL-MCNC: 119 MG/DL — HIGH (ref 70–99)
HCT VFR BLD CALC: 43.1 % — SIGNIFICANT CHANGE UP (ref 39–50)
HGB BLD-MCNC: 14.6 G/DL — SIGNIFICANT CHANGE UP (ref 13–17)
IMM GRANULOCYTES NFR BLD AUTO: 0.3 % — SIGNIFICANT CHANGE UP (ref 0–1.5)
LIDOCAIN IGE QN: 121 U/L — SIGNIFICANT CHANGE UP (ref 73–393)
LYMPHOCYTES # BLD AUTO: 1.76 K/UL — SIGNIFICANT CHANGE UP (ref 1–3.3)
LYMPHOCYTES # BLD AUTO: 28.7 % — SIGNIFICANT CHANGE UP (ref 13–44)
MCHC RBC-ENTMCNC: 27.9 PG — SIGNIFICANT CHANGE UP (ref 27–34)
MCHC RBC-ENTMCNC: 33.9 GM/DL — SIGNIFICANT CHANGE UP (ref 32–36)
MCV RBC AUTO: 82.4 FL — SIGNIFICANT CHANGE UP (ref 80–100)
MONOCYTES # BLD AUTO: 0.38 K/UL — SIGNIFICANT CHANGE UP (ref 0–0.9)
MONOCYTES NFR BLD AUTO: 6.2 % — SIGNIFICANT CHANGE UP (ref 2–14)
NEUTROPHILS # BLD AUTO: 3.96 K/UL — SIGNIFICANT CHANGE UP (ref 1.8–7.4)
NEUTROPHILS NFR BLD AUTO: 64.4 % — SIGNIFICANT CHANGE UP (ref 43–77)
NRBC # BLD: 0 /100 WBCS — SIGNIFICANT CHANGE UP (ref 0–0)
PLATELET # BLD AUTO: 368 K/UL — SIGNIFICANT CHANGE UP (ref 150–400)
POTASSIUM SERPL-MCNC: 3.8 MMOL/L — SIGNIFICANT CHANGE UP (ref 3.5–5.3)
POTASSIUM SERPL-SCNC: 3.8 MMOL/L — SIGNIFICANT CHANGE UP (ref 3.5–5.3)
PROT SERPL-MCNC: 7.2 G/DL — SIGNIFICANT CHANGE UP (ref 6–8.3)
RBC # BLD: 5.23 M/UL — SIGNIFICANT CHANGE UP (ref 4.2–5.8)
RBC # FLD: 13.5 % — SIGNIFICANT CHANGE UP (ref 10.3–14.5)
SODIUM SERPL-SCNC: 143 MMOL/L — SIGNIFICANT CHANGE UP (ref 135–145)
WBC # BLD: 6.14 K/UL — SIGNIFICANT CHANGE UP (ref 3.8–10.5)
WBC # FLD AUTO: 6.14 K/UL — SIGNIFICANT CHANGE UP (ref 3.8–10.5)

## 2022-03-20 PROCEDURE — 96374 THER/PROPH/DIAG INJ IV PUSH: CPT | Mod: XU

## 2022-03-20 PROCEDURE — 99285 EMERGENCY DEPT VISIT HI MDM: CPT

## 2022-03-20 PROCEDURE — 80053 COMPREHEN METABOLIC PANEL: CPT

## 2022-03-20 PROCEDURE — 96361 HYDRATE IV INFUSION ADD-ON: CPT

## 2022-03-20 PROCEDURE — 93005 ELECTROCARDIOGRAM TRACING: CPT

## 2022-03-20 PROCEDURE — 83690 ASSAY OF LIPASE: CPT

## 2022-03-20 PROCEDURE — 74177 CT ABD & PELVIS W/CONTRAST: CPT | Mod: 26,MA

## 2022-03-20 PROCEDURE — 85025 COMPLETE CBC W/AUTO DIFF WBC: CPT

## 2022-03-20 PROCEDURE — 93010 ELECTROCARDIOGRAM REPORT: CPT

## 2022-03-20 PROCEDURE — 74177 CT ABD & PELVIS W/CONTRAST: CPT | Mod: MA

## 2022-03-20 PROCEDURE — 36415 COLL VENOUS BLD VENIPUNCTURE: CPT

## 2022-03-20 PROCEDURE — 99285 EMERGENCY DEPT VISIT HI MDM: CPT | Mod: 25

## 2022-03-20 RX ORDER — PANTOPRAZOLE SODIUM 20 MG/1
40 TABLET, DELAYED RELEASE ORAL ONCE
Refills: 0 | Status: COMPLETED | OUTPATIENT
Start: 2022-03-20 | End: 2022-03-20

## 2022-03-20 RX ORDER — SODIUM CHLORIDE 9 MG/ML
1000 INJECTION INTRAMUSCULAR; INTRAVENOUS; SUBCUTANEOUS ONCE
Refills: 0 | Status: COMPLETED | OUTPATIENT
Start: 2022-03-20 | End: 2022-03-20

## 2022-03-20 RX ORDER — SUCRALFATE 1 G
1 TABLET ORAL
Qty: 56 | Refills: 0
Start: 2022-03-20 | End: 2022-04-02

## 2022-03-20 RX ORDER — SUCRALFATE 1 G
1 TABLET ORAL ONCE
Refills: 0 | Status: COMPLETED | OUTPATIENT
Start: 2022-03-20 | End: 2022-03-20

## 2022-03-20 RX ORDER — PANTOPRAZOLE SODIUM 20 MG/1
1 TABLET, DELAYED RELEASE ORAL
Qty: 14 | Refills: 0
Start: 2022-03-20 | End: 2022-04-02

## 2022-03-20 RX ORDER — PANTOPRAZOLE SODIUM 20 MG/1
1 TABLET, DELAYED RELEASE ORAL
Qty: 14 | Refills: 0
Start: 2022-03-20 | End: 2022-05-23

## 2022-03-20 RX ADMIN — Medication 1 GRAM(S): at 16:54

## 2022-03-20 RX ADMIN — Medication 30 MILLILITER(S): at 16:54

## 2022-03-20 RX ADMIN — PANTOPRAZOLE SODIUM 40 MILLIGRAM(S): 20 TABLET, DELAYED RELEASE ORAL at 16:54

## 2022-03-20 RX ADMIN — SODIUM CHLORIDE 1000 MILLILITER(S): 9 INJECTION INTRAMUSCULAR; INTRAVENOUS; SUBCUTANEOUS at 17:10

## 2022-03-20 RX ADMIN — SODIUM CHLORIDE 1000 MILLILITER(S): 9 INJECTION INTRAMUSCULAR; INTRAVENOUS; SUBCUTANEOUS at 16:10

## 2022-03-20 NOTE — ED PROVIDER NOTE - PATIENT PORTAL LINK FT
You can access the FollowMyHealth Patient Portal offered by [facility] by registering at the following website: [email or web address]. By joining [facility]’s FollowMyHealth portal, you will also be able to view your health information using other applications (apps) compatible with our system.

## 2022-03-20 NOTE — ED PROVIDER NOTE - OBJECTIVE STATEMENT
Pt is a 55 yo male with pmhx of covid inguinal hernia hydrocele here for upper abdominal pain for 3 weeks no associated nvd fever chills pain getting worse no chest pain no sob. Pt had out pt US RUQ which was normal as per pt. pt denies taking anything for symptoms.

## 2022-03-20 NOTE — ED ADULT TRIAGE NOTE - CHIEF COMPLAINT QUOTE
Patient is a 57yo male complaining of right upper quadrant abdominal pain 3 .5 weeks patient denies nausea vomiting diarrhea fever

## 2022-03-20 NOTE — ED ADULT NURSE NOTE - NSIMPLEMENTINTERV_GEN_ALL_ED
Implemented All Universal Safety Interventions:  [place] to call system. Call bell, personal items and telephone within reach. Instruct patient to call for assistance. Room bathroom lighting operational. Non-slip footwear when patient is off stretcher. Physically safe environment: no spills, clutter or unnecessary equipment. Stretcher in lowest position, wheels locked, appropriate side rails in place.

## 2022-03-20 NOTE — ED PROVIDER NOTE - NSFOLLOWUPINSTRUCTIONS_ED_ALL_ED_FT
Follow up with GI  return to er for any worsening symptoms    Dolor abdominal en los adultos    Abdominal Pain, Adult      El dolor en el abdomen (dolor abdominal) puede tener muchas causas. A menudo, el dolor abdominal no es grave y mejora sin tratamiento o con tratamiento en la casa. Sin embargo, a veces el dolor abdominal es grave.    El médico le hará preguntas sobre wolf antecedentes médicos y le hará un examen físico para tratar de determinar la causa del dolor abdominal.      Siga estas instrucciones en iqbal casa:     Medicamentos     •Use los medicamentos de venta blanca y los recetados solamente belinda se lo haya indicado el médico.      • No tome un laxante a menos que se lo haya indicado el médico.      Instrucciones generales     •Controle iqbal afección para detectar cualquier cambio.      •Nenita suficiente líquido belinda para mantener la orina de color amarillo pálido.      •Concurra a todas las visitas de seguimiento belinda se lo haya indicado el médico. [place] es importante.        Comuníquese con un médico si:    •El dolor abdominal cambia o empeora.      •No tiene apetito o baja de peso sin proponérselo.      •Está estreñido o tiene diarrea terri más de 2 o 3 días.      •Tiene dolor cuando orina o defeca.      •El dolor abdominal lo despierta de noche.      •El dolor empeora con las comidas, después de comer o con determinados alimentos.      •Tiene vómitos y no puede retener nada de lo que ingiere.      •Tiene fiebre.      •Observa bibi en la orina.        Solicite ayuda inmediatamente si:    •El dolor no desaparece tan pronto belinda el médico le dijo que era esperable.      •No puede dejar de vomitar.      •El dolor se siente solo en zonas del abdomen, belinda el lado derecho o la parte inferior izquierda del abdomen. Si se localiza en la aiyana derecha, posiblemente podría tratarse de apendicitis.      •Las heces son sanguinolentas o de color geovanny, o de aspecto alquitranado.      •Tiene dolor intenso, cólicos o distensión abdominal.    •Tiene signos de deshidratación, belinda los siguientes:  •Orina de color oscuro, muy escasa o falta de orina.      •Labios agrietados.      •Sequedad de boca.      •Ojos hundidos.      •Somnolencia.      •Debilidad.        •Tiene dificultad para respirar o dolor en el pecho.        Resumen    •A menudo, el dolor abdominal no es grave y mejora sin tratamiento o con tratamiento en la casa. Sin embargo, a veces el dolor abdominal es grave.      •Controle iqbal afección para detectar cualquier cambio.      •Use los medicamentos de venta blanca y los recetados solamente belinda se lo haya indicado el médico.      •Comuníquese con un médico si el dolor abdominal cambia o empeora.      •Busque ayuda de inmediato si tiene dolor intenso, cólicos o distensión abdominal.      Esta información no tiene belinda fin reemplazar el consejo del médico. Asegúrese de hacerle al médico cualquier pregunta que tenga.

## 2022-03-20 NOTE — ED PROVIDER NOTE - CLINICAL SUMMARY MEDICAL DECISION MAKING FREE TEXT BOX
Pt is a 55 yo male with upper abdominal pain will get labs ct scan GI meds Pt is a 57 yo male with upper abdominal pain for 3 weeks, had outpt us of gb negative, will get labs ct scan GI meds pt on exam with mild epigastric ttp, exam o/w neg, labs essentially normal, ekg wnl, pt imrproved with gi cocktail.  will d/c pt with pain meds, antacids and fu gi Pt is a 55 yo male with upper abdominal pain for 3 weeks, had outpt us of gb negative, will get labs ct scan GI meds pt on exam with mild epigastric ttp, exam o/w neg, labs essentially normal, ekg wnl, pt imrproved with gi cocktail.  will d/c pt with pain meds, antacids and fu gi    DT: I have personally performed a face to face diagnostic evaluation on this patient.  I have reviewed the NP's note and agree with the history, exam, and plan of care, except as noted.  History and Exam by me shows Pt is a 55 yo male with pmhx of covid inguinal hernia hydrocele here for upper abdominal pain for 3 weeks no associated nvd fever chills pain getting worse no chest pain no sob. Pt had out pt US RUQ which was normal as per pt. pt denies taking anything for symptoms. .  Patient is NAD.  A n O x 3. Head NC/AT. Lungs cta bl. Heart s1,s2, rrr, no murmurs. Abd-soft, nt, no guarding, no rebound, no distension, no cva tenderness. Ext- FROM actively,  ambulating s any difficulty.  Labs and ct  were unremarkable.

## 2022-03-20 NOTE — ED PROVIDER NOTE - NS ED ATTENDING STATEMENT MOD
This was a shared visit with the JOSE GUADALUPE. I reviewed and verified the documentation and independently performed the documented:

## 2022-03-20 NOTE — ED ADULT TRIAGE NOTE - BP NONINVASIVE SYSTOLIC (MM HG)
EMERGENCY DEPARTMENT HISTORY AND PHYSICAL EXAM 
 
11:18 AM 
 
 
Date: 12/1/2018 Patient Name: Olive Perez History of Presenting Illness Chief Complaint Patient presents with  Lethargy History Provided By: Patient and Patient's Relative Chief Complaint: Weakness Duration: 6 Hours Timing:  Acute Location: generalized Quality: N/A Severity: Moderate Modifying Factors: No modifying or aggravating factors were reported. Associated Symptoms: Abnormal speech, confusion, neck stiffness, fatigue Additional History (Context): Olive Perez is a [de-identified] y.o. male with hyperlipidemia, myocardial infarction, stroke and CKD, CAD who presents with acute, moderate, generalized weakness onset about 6 hours ago. Associated symptoms include abnormal speech, confusion, neck stiffness, appetite change, and fatigue, but he denies chest pain, HA, or any other pain. His relative states that he was admitted for MI 7 days ago, and had full bypass, but no stent placed. She states that he refused rehab and went home where he lives with his son and daughter-in-law. She reports that he was weak and shaky 3 days ago. He states that he is currently taking aspirin but has not taken any of his medications today. No other concerns or symptoms at this time. PCP: Weston Gonsalez., DO 
 
Current Facility-Administered Medications Medication Dose Route Frequency Provider Last Rate Last Dose  aspirin chewable tablet 324 mg  324 mg Oral NOW Joanna Huddleston MD      
 
Current Outpatient Medications Medication Sig Dispense Refill  carvedilol (COREG) 3.125 mg tablet Take 1 Tab by mouth two (2) times daily (with meals). 30 Tab 0  clopidogrel (PLAVIX) 75 mg tab TAKE 1 TABLET BY MOUTH DAILY 90 Tab 4  
 atorvastatin (LIPITOR) 20 mg tablet Take 1 Tab by mouth daily. 90 Tab 4  
 tamsulosin (FLOMAX) 0.4 mg capsule Take 1 Cap by mouth nightly.  90 Cap 4  
  aspirin (ASPIRIN) 325 mg tablet Take 1 Tab by mouth daily. 90 Tab 4  
 donepezil (ARICEPT) 10 mg tablet TAKE 2 TABLETS BY MOUTH EVERY MORNING AFTER A MEAL 180 Tab 1  
 NITROGLYCERIN (NITROSTAT SL) 1 Tab by SubLINGual route as needed (chest pain).  omega-3 fatty acids-vitamin e (FISH OIL) 1,000 mg cap Take 1 Cap by mouth two (2) times a day. 180 Cap 4 Past History Past Medical History: 
Past Medical History:  
Diagnosis Date  Advance directive in chart 2016  CAD (coronary artery disease)  Cancer [facility])  Colon  CKD (chronic kidney disease), stage III ([address] 3/27/2016  CVA (cerebral vascular accident) ([address] 3/26/2016  Femoral artery aneurysm, left ([address]  Heart attack [facility])   Heart attack ([address]  Hernia  History of TIAs  Hyperlipidemia  Hypertension 3/27/2016  Iliac artery aneurysm, left ([address]  Pure hypercholesterolemia 2012  PVD (peripheral vascular disease) ([address] 3/27/2016  Stroke due to embolism of right middle cerebral artery ([address] 3/26/2016 Past Surgical History: 
Past Surgical History:  
Procedure Laterality Date  ABDOMEN SURGERY PROC UNLISTED    
 3 stents placed into abdomen (groin)  CARDIAC SURG PROCEDURE UNLIST   Quadruple Bypass  CARDIAC SURG PROCEDURE UNLIST   Aortic pig valve placed  COLONOSCOPY N/A 2016 COLONOSCOPY performed [facility] Kim V[facility] at [facility] ENDOSCOPY  ENDOSCOPY, COLON, DIAGNOSTIC    
 HX AAA REPAIR    
 HX ORTHOPAEDIC  2008 Right Leg Amputated Family History: 
Family History Problem Relation Age of Onset  Hypertension Mother  Hypertension Father  Heart Disease Father  Heart Disease Brother Social History: 
Social History Tobacco Use  Smoking status: Former Smoker Last attempt to quit: 1987 Years since quittin.2  Smokeless tobacco: Never Used Substance Use Topics  Alcohol use:  No  
 Comment: wine 1-2x per week  Drug use: No  
 
 
Allergies: Allergies Allergen Reactions  Ace Inhibitors Other (comments) Per pt, Cardiologist states he cannot have ACE inhibitors Review of Systems Review of Systems Constitutional: Positive for appetite change and fatigue. Negative for chills. HENT: Negative for congestion and sore throat. Respiratory: Negative for cough and shortness of breath. Cardiovascular: Negative for chest pain. Gastrointestinal: Negative for abdominal pain, diarrhea, nausea and vomiting. Genitourinary: Negative for dysuria. Musculoskeletal: Positive for neck stiffness. Negative for back pain. Skin: Negative for rash. Neurological: Positive for speech difficulty and weakness. Negative for dizziness and headaches. Psychiatric/Behavioral: Positive for confusion. All other systems reviewed and are negative. Physical Exam  
 
Visit Vitals /68 Pulse 79 Temp 98.8 °F (37.1 °C) Resp 19 Ht 5' 8\" (1.727 m) Wt 99.8 kg (220 lb) SpO2 98% BMI 33.45 kg/m² Physical Exam 
General Exam: Patient is well developed and well nourished in no distress. Patient does not appear acutely ill or toxic. Elderly, frail appearing. Eye Exam: Lids and conjunctiva are normal 
ENT Exam: The general head and facial exam is normal.  The neck is supple without meningeal signs. No significant adenopathy. Pulmonary Exam: No respiratory distress. The respiratory rate is normal.  No stridor. The breath sounds are equal bilaterally. There are no wheezes, rales, or rhonchi noted. Cardiac Exam: The cardiac rate is normal. Rhythm is irregular. No significant murmurs, rubs, or gallops. The peripheral pulses are normal. 
Abdominal Exam: Abdomen is soft and non-distended. No pulsatile masses. There is no local tenderness. There is no rebound or guarding noted. Skin and Soft Tissue:  The skin is warm and dry, without significant abnormality. Good color. Musculoskeletal Exam: There is no clubbing or cyanosis. There is no peripheral edema. The musculoskeletal exam of the lower extremities is normal without significant local tenderness or spasm. Right Leg prosthesis. Neurologic: Pt is alert and appropriate. Normal symmetric muscle strength and tone in all extremities. Cranial nerves 2-12 intact. No pronator drift or leg drift. Mild slurred speech. Psychiatric: Normal adult with appropriate demeanor and interpersonal interaction. Is oriented to person, place, and time. Diagnostic Study Results Labs - Recent Results (from the past 12 hour(s)) TYPE & SCREEN Collection Time: 12/01/18 11:30 AM  
Result Value Ref Range Crossmatch Expiration 12/04/2018 ABO/Rh(D) PENDING Antibody screen PENDING   
EKG, 12 LEAD, INITIAL Collection Time: 12/01/18 11:30 AM  
Result Value Ref Range Ventricular Rate 84 BPM  
 Atrial Rate 84 BPM  
 P-R Interval 168 ms QRS Duration 112 ms  
 Q-T Interval 366 ms  
 QTC Calculation (Bezet) 432 ms Calculated P Axis 42 degrees Calculated R Axis 119 degrees Calculated T Axis -107 degrees Diagnosis Sinus rhythm with premature atrial complexes in a pattern of bigeminy Left posterior fascicular block ST & T wave abnormality, consider inferior ischemia Abnormal ECG When compared with ECG of 24-NOV-2018 16:48, Left posterior fascicular block is now present Incomplete left bundle branch block is no longer present GLUCOSE, POC Collection Time: 12/01/18 11:31 AM  
Result Value Ref Range Glucose (POC) 110 70 - 110 mg/dL CBC WITH AUTOMATED DIFF Collection Time: 12/01/18 11:35 AM  
Result Value Ref Range WBC 21.1 (H) 4.6 - 13.2 K/uL  
 RBC 3.82 (L) 4.70 - 5.50 M/uL  
 HGB 12.7 (L) 13.0 - 16.0 g/dL HCT 37.4 36.0 - 48.0 % MCV 97.9 (H) 74.0 - 97.0 FL  
 MCH 33.2 24.0 - 34.0 PG  
 MCHC 34.0 31.0 - 37.0 g/dL  
 RDW 14.9 (H) 11.6 - 14.5 % PLATELET 467 421 - 159 K/uL MPV 11.0 9.2 - 11.8 FL  
 NEUTROPHILS 87 (H) 40 - 73 % LYMPHOCYTES 4 (L) 21 - 52 % MONOCYTES 9 3 - 10 % EOSINOPHILS 0 0 - 5 % BASOPHILS 0 0 - 2 %  
 ABS. NEUTROPHILS 18.4 (H) 1.8 - 8.0 K/UL  
 ABS. LYMPHOCYTES 0.9 0.9 - 3.6 K/UL  
 ABS. MONOCYTES 1.8 (H) 0.05 - 1.2 K/UL  
 ABS. EOSINOPHILS 0.0 0.0 - 0.4 K/UL  
 ABS. BASOPHILS 0.1 0.0 - 0.1 K/UL  
 DF AUTOMATED METABOLIC PANEL, COMPREHENSIVE Collection Time: 12/01/18 11:35 AM  
Result Value Ref Range Sodium 135 (L) 136 - 145 mmol/L Potassium 3.8 3.5 - 5.5 mmol/L Chloride 103 100 - 108 mmol/L  
 CO2 27 21 - 32 mmol/L Anion gap 5 3.0 - 18 mmol/L Glucose 113 (H) 74 - 99 mg/dL BUN 34 (H) 7.0 - 18 MG/DL Creatinine 2.03 (H) 0.6 - 1.3 MG/DL  
 BUN/Creatinine ratio 17 12 - 20 GFR est AA 38 (L) >60 ml/min/1.73m2 GFR est non-AA 32 (L) >60 ml/min/1.73m2 Calcium 9.6 8.5 - 10.1 MG/DL Bilirubin, total 0.8 0.2 - 1.0 MG/DL  
 ALT (SGPT) 116 (H) 16 - 61 U/L  
 AST (SGOT) 65 (H) 15 - 37 U/L Alk. phosphatase 223 (H) 45 - 117 U/L Protein, total 8.0 6.4 - 8.2 g/dL Albumin 3.4 3.4 - 5.0 g/dL Globulin 4.6 (H) 2.0 - 4.0 g/dL A-G Ratio 0.7 (L) 0.8 - 1.7 MAGNESIUM Collection Time: 12/01/18 11:35 AM  
Result Value Ref Range Magnesium 2.4 1.6 - 2.6 mg/dL TROPONIN I Collection Time: 12/01/18 11:35 AM  
Result Value Ref Range Troponin-I, Qt. 1.13 (H) 0.0 - 0.045 NG/ML  
PROTHROMBIN TIME + INR Collection Time: 12/01/18 11:35 AM  
Result Value Ref Range Prothrombin time 14.8 11.5 - 15.2 sec INR 1.2 0.8 - 1.2 THROMBIN TIME Collection Time: 12/01/18 11:35 AM  
Result Value Ref Range Thrombin time 15.1 13.8 - 18.2 SECS FIBRINOGEN Collection Time: 12/01/18 11:35 AM  
Result Value Ref Range Fibrinogen 988 (H) 210 - 451 mg/dL ASPIRIN TEST Collection Time: 12/01/18 11:35 AM  
Result Value Ref Range  Aspirin test 385 (L) 620 - 672 ARU  
 
 
 Radiologic Studies -  
CT HEAD WO CONT Final Result XR CHEST PORT    (Results Pending) CT HEAD WO CONT IMPRESSION: 1.  Atrophy and chronic small vessel changes with areas of old infarction, 
unchanged. 2.  Otherwise, unremarkable noncontrast head CT.  Specifically, no acute 
intracranial abnormalities are identified. Medical Decision Making I am the first provider for this patient. I reviewed the vital signs, available nursing notes, past medical history, past surgical history, family history and social history. Vital Signs-Reviewed the patient's vital signs. Pulse Oximetry Analysis -  91% on room air Cardiac Monitor: 
Rate: 79 BPM 
 
EKG: Interpreted by the EP. Time Interpreted: 11:30 AM 
 Rate: 84 PBM Rhythm: Normal Sinus Rhythm Interpretation: Frequent PACs, no STEMI Records Reviewed: Nursing Notes and Old Medical Records (Time of Review: 11:18 AM) ED Course: Progress Notes, Reevaluation, and Consults: 
11:40 AM: Consult:  Discussed care with Dr. Hayley Coffey (Neurology) Standard discussion; including history of patients chief complaint, available diagnostic results, and treatment course. 1:56 PM: Consult:  Discussed care with Dr. Evan Antunez [facility]) Standard discussion; including history of patients chief complaint, available diagnostic results, and treatment course. Will accept admission. Provider Notes (Medical Decision Making): Pt with recent admission for cardiac issues presenting with weakness and concern for abnormal speech. Stroke workup initiated. On further evaluation, concern for infection as pt with elevated WBC. U/A with infection. Troponin elevated but pt without CP and could be trending down from recent cardiac event. For Hospitalized Patients: 
 
1. Hospitalization Decision Time: The decision to hospitalize the patient was made by Dr. Derek Landers at 13:47 on 12/1/2018 2. Aspirin: Aspirin was given Diagnosis Clinical Impression: ICD-10-CM ICD-9-CM 1. Weakness R53.1 780.79 2. Speech disturbance, unspecified type R47.9 784.59  
3. Elevated troponin R74.8 790.6 4. Leukocytosis, unspecified type D72.829 288.60  
5. Urinary tract infection with hematuria, site unspecified N39.0 599.0  
 R31.9 599.70 Disposition: Admit Follow-up Information None Medication List  
  
ASK your doctor about these medications   
aspirin 325 mg tablet Commonly known as:  ASPIRIN Take 1 Tab by mouth daily. atorvastatin 20 mg tablet Commonly known as:  LIPITOR Take 1 Tab by mouth daily. carvedilol 3.125 mg tablet Commonly known as:  Carrolyn Peabody Take 1 Tab by mouth two (2) times daily (with meals). clopidogrel 75 mg Tab Commonly known as:  PLAVIX TAKE 1 TABLET BY MOUTH DAILY 
  
donepezil 10 mg tablet Commonly known as:  ARICEPT 
TAKE 2 TABLETS BY MOUTH EVERY MORNING AFTER A MEAL 
  
NITROSTAT SL 
  
omega-3 fatty acids-vitamin e 1,000 mg Cap Commonly known as:  FISH OIL Take 1 Cap by mouth two (2) times a day. tamsulosin 0.4 mg capsule Commonly known as:  FLOMAX Take 1 Cap by mouth nightly. 
  
  
 
_______________________________ Scribe Attestation Bea Prieto acting as a scribe for and in the presence of Svetlana Bejarano MD     
December 01, 2018 at 11:18 AM 
    
Provider Attestation:     
I personally performed the services described in the documentation, reviewed the documentation, as recorded by the scribe in my presence, and it accurately and completely records my words and actions. December 01, 2018 at 11:18 AM - Svetlana Bejarano MD   
 
 
_______________________________ 125

## 2022-03-20 NOTE — ED PROVIDER NOTE - CARE PROVIDER_API CALL
Amari Loza (DO)  Gastroenterology; Internal Medicine  [address] [place] [id number]  Phone: [phone]  Fax: [phone]  Follow Up Time:

## 2022-05-10 ENCOUNTER — EMERGENCY (EMERGENCY)
Facility: HOSPITAL | Age: 57
LOS: 1 days | Discharge: HOME | End: 2022-05-10
Attending: EMERGENCY MEDICINE | Admitting: EMERGENCY MEDICINE
Payer: COMMERCIAL

## 2022-05-10 VITALS
RESPIRATION RATE: 18 BRPM | OXYGEN SATURATION: 97 % | HEART RATE: 78 BPM | TEMPERATURE: 98 F | SYSTOLIC BLOOD PRESSURE: 112 MMHG | DIASTOLIC BLOOD PRESSURE: 65 MMHG

## 2022-05-10 VITALS
TEMPERATURE: 98 F | RESPIRATION RATE: 19 BRPM | DIASTOLIC BLOOD PRESSURE: 83 MMHG | SYSTOLIC BLOOD PRESSURE: 129 MMHG | HEIGHT: 65 IN | OXYGEN SATURATION: 96 % | HEART RATE: 92 BPM | WEIGHT: 149.91 LBS

## 2022-05-10 DIAGNOSIS — Z98.890 OTHER SPECIFIED POSTPROCEDURAL STATES: Chronic | ICD-10-CM

## 2022-05-10 LAB
ALBUMIN SERPL ELPH-MCNC: 3.8 G/DL — SIGNIFICANT CHANGE UP (ref 3.3–5)
ALP SERPL-CCNC: 91 U/L — SIGNIFICANT CHANGE UP (ref 40–120)
ALT FLD-CCNC: 49 U/L — SIGNIFICANT CHANGE UP (ref 12–78)
ANION GAP SERPL CALC-SCNC: 6 MMOL/L — SIGNIFICANT CHANGE UP (ref 5–17)
AST SERPL-CCNC: 21 U/L — SIGNIFICANT CHANGE UP (ref 15–37)
BASOPHILS # BLD AUTO: 0.01 K/UL — SIGNIFICANT CHANGE UP (ref 0–0.2)
BASOPHILS NFR BLD AUTO: 0.2 % — SIGNIFICANT CHANGE UP (ref 0–2)
BILIRUB SERPL-MCNC: 0.2 MG/DL — SIGNIFICANT CHANGE UP (ref 0.2–1.2)
BUN SERPL-MCNC: 16 MG/DL — SIGNIFICANT CHANGE UP (ref 7–23)
CALCIUM SERPL-MCNC: 8.9 MG/DL — SIGNIFICANT CHANGE UP (ref 8.5–10.1)
CHLORIDE SERPL-SCNC: 110 MMOL/L — HIGH (ref 96–108)
CO2 SERPL-SCNC: 26 MMOL/L — SIGNIFICANT CHANGE UP (ref 22–31)
CREAT SERPL-MCNC: 0.98 MG/DL — SIGNIFICANT CHANGE UP (ref 0.5–1.3)
EGFR: 91 ML/MIN/1.73M2 — SIGNIFICANT CHANGE UP
EOSINOPHIL # BLD AUTO: 0.02 K/UL — SIGNIFICANT CHANGE UP (ref 0–0.5)
EOSINOPHIL NFR BLD AUTO: 0.3 % — SIGNIFICANT CHANGE UP (ref 0–6)
GLUCOSE SERPL-MCNC: 92 MG/DL — SIGNIFICANT CHANGE UP (ref 70–99)
HCT VFR BLD CALC: 43.5 % — SIGNIFICANT CHANGE UP (ref 39–50)
HGB BLD-MCNC: 14.6 G/DL — SIGNIFICANT CHANGE UP (ref 13–17)
IMM GRANULOCYTES NFR BLD AUTO: 0.2 % — SIGNIFICANT CHANGE UP (ref 0–1.5)
LIDOCAIN IGE QN: 112 U/L — SIGNIFICANT CHANGE UP (ref 73–393)
LYMPHOCYTES # BLD AUTO: 1.69 K/UL — SIGNIFICANT CHANGE UP (ref 1–3.3)
LYMPHOCYTES # BLD AUTO: 28.9 % — SIGNIFICANT CHANGE UP (ref 13–44)
MCHC RBC-ENTMCNC: 27.8 PG — SIGNIFICANT CHANGE UP (ref 27–34)
MCHC RBC-ENTMCNC: 33.6 GM/DL — SIGNIFICANT CHANGE UP (ref 32–36)
MCV RBC AUTO: 82.9 FL — SIGNIFICANT CHANGE UP (ref 80–100)
MONOCYTES # BLD AUTO: 0.48 K/UL — SIGNIFICANT CHANGE UP (ref 0–0.9)
MONOCYTES NFR BLD AUTO: 8.2 % — SIGNIFICANT CHANGE UP (ref 2–14)
NEUTROPHILS # BLD AUTO: 3.64 K/UL — SIGNIFICANT CHANGE UP (ref 1.8–7.4)
NEUTROPHILS NFR BLD AUTO: 62.2 % — SIGNIFICANT CHANGE UP (ref 43–77)
NRBC # BLD: 0 /100 WBCS — SIGNIFICANT CHANGE UP (ref 0–0)
PLATELET # BLD AUTO: 403 K/UL — HIGH (ref 150–400)
POTASSIUM SERPL-MCNC: 3.8 MMOL/L — SIGNIFICANT CHANGE UP (ref 3.5–5.3)
POTASSIUM SERPL-SCNC: 3.8 MMOL/L — SIGNIFICANT CHANGE UP (ref 3.5–5.3)
PROT SERPL-MCNC: 7.7 G/DL — SIGNIFICANT CHANGE UP (ref 6–8.3)
RBC # BLD: 5.25 M/UL — SIGNIFICANT CHANGE UP (ref 4.2–5.8)
RBC # FLD: 13.1 % — SIGNIFICANT CHANGE UP (ref 10.3–14.5)
SODIUM SERPL-SCNC: 142 MMOL/L — SIGNIFICANT CHANGE UP (ref 135–145)
WBC # BLD: 5.85 K/UL — SIGNIFICANT CHANGE UP (ref 3.8–10.5)
WBC # FLD AUTO: 5.85 K/UL — SIGNIFICANT CHANGE UP (ref 3.8–10.5)

## 2022-05-10 PROCEDURE — 80053 COMPREHEN METABOLIC PANEL: CPT

## 2022-05-10 PROCEDURE — 36415 COLL VENOUS BLD VENIPUNCTURE: CPT

## 2022-05-10 PROCEDURE — 85025 COMPLETE CBC W/AUTO DIFF WBC: CPT

## 2022-05-10 PROCEDURE — 99284 EMERGENCY DEPT VISIT MOD MDM: CPT

## 2022-05-10 PROCEDURE — 83690 ASSAY OF LIPASE: CPT

## 2022-05-10 PROCEDURE — 99283 EMERGENCY DEPT VISIT LOW MDM: CPT

## 2022-05-10 RX ORDER — SODIUM CHLORIDE 9 MG/ML
1000 INJECTION INTRAMUSCULAR; INTRAVENOUS; SUBCUTANEOUS ONCE
Refills: 0 | Status: COMPLETED | OUTPATIENT
Start: 2022-05-10 | End: 2022-05-10

## 2022-05-10 RX ORDER — LACTOBACILLUS ACIDOPHILUS 100MM CELL
1 CAPSULE ORAL
Qty: 28 | Refills: 0
Start: 2022-05-10 | End: 2022-05-23

## 2022-05-10 RX ADMIN — SODIUM CHLORIDE 1000 MILLILITER(S): 9 INJECTION INTRAMUSCULAR; INTRAVENOUS; SUBCUTANEOUS at 11:56

## 2022-05-10 NOTE — ED ADULT NURSE NOTE - CAS TRG GENERAL AIRWAY, MLM
Peripheral IV  Performed by: Chava Spears MD  Authorized by: Chava Spears MD     Size:  18 G  Laterality:  Right  Location:  Hand  Local Anesthetic:  None  Site Prep:  AlcoholTechnique:  Anatomical landmarks  Attempts:  1  Securement: Tape and Transparent dressing  Performed By:  Anesthesiologist  Anesthesiologist:  Chava Spears MD   Second IV placed in usual fashion and without complications.          Patent

## 2022-05-10 NOTE — ED PROVIDER NOTE - CARE PROVIDER_API CALL
Amari Loza (DO)  Gastroenterology; Internal Medicine  [address] [place] [address]  Phone: [phone]  Fax: [phone]  Follow Up Time: 1-3 Days

## 2022-05-10 NOTE — ED PROVIDER NOTE - PATIENT PORTAL LINK FT
You can access the FollowMyHealth Patient Portal offered by [facility] by registering at the following website: [email or web address]. By joining [employer]’s FollowMyHealth portal, you will also be able to view your health information using other applications (apps) compatible with our system.

## 2022-05-10 NOTE — ED PROVIDER NOTE - TOBACCO USE
Take antibiotic as prescribed until finished.  Take pain medication for severe pain.  Can continue taking ibuprofen as needed.  Do salt water rinses.    Schedule an appointment with your dentist for follow-up.    Return to emergency department for worsening concerning symptoms.   Never smoker

## 2022-05-10 NOTE — ED ADULT NURSE REASSESSMENT NOTE - NS ED NURSE REASSESS COMMENT FT1
1310:  patient states he is doing much better.  abdominal pain is improved.  no bm as of yet.  vitals recorded.  joe randall.

## 2022-05-10 NOTE — ED PROVIDER NOTE - PHYSICAL EXAMINATION
PE:   GEN: Awake, alert, interactive, NAD, non-toxic appearing.   HEAD: Atraumatic  EYES: Sclera white, conjunctiva pink, PERRLA  CARDIAC: Reg rate and rhythm, S1,S2, no murmur/rub/gallop. Strong central and peripheral pulses, Brisk cap refill, no evident pedal edema.   RESP: No distress noted. L/S clear = Bilat without accessory muscle use, wheeze, rhonchi, rales.   ABD: soft, supple, slight epigastric TTP, negative murphys ssign  no guarding. BS x 4, normoactive.   NEURO: AOx3, CN II-XII grossly intact without focal deficit.   MSK: Moving all extremities with no apparent deformities.   SKIN: Warm, dry, normal color, without apparent rashes.

## 2022-05-10 NOTE — ED PROVIDER NOTE - CLINICAL SUMMARY MEDICAL DECISION MAKING FREE TEXT BOX
DT: I have personally performed a face to face diagnostic evaluation on this patient.  I have reviewed the NP's note and agree with the history, exam, and plan of care, except as noted.  History and Exam by me shows 57 y/o M with PMH BL inguinal hernia repair, hydrocele and 3 months fo RUQ pain presents to ED for diarrhea. Pt was seen here 2 month ago for RUQ pain. Work up was essentially negative , told he had gastritis and advised to fu with GI. Never did. Epigastric pain got better, states sometimes it is "inflamed" but now having 9 days of water diarrhea. No fever chills nausea or vomiting. No dysuria or hematuria. Appetite good. Pt reports he actully lost 10lbs in 2 months as eh has been trying to eat healthier due and not eating fatty/ greasy foods for the epigastric pain he was having. Denies recent travel or sick contacts. Took imodium and pepto bismol abut not helping. Had 4 BMs last night. Denies blood in stool. No recent abx use .  Patient is NAD.  A n O x 3. Head NC/AT. Lungs cta bl. Heart s1,s2, rrr, no murmurs. Abd-soft, nt, no guarding, no rebound, no distension, no cva tenderness. Ext- FROM actively,  ambulating s any difficulty.  Labs were unremarkable.

## 2022-05-10 NOTE — ED ADULT NURSE REASSESSMENT NOTE - NS ED NURSE REASSESS COMMENT FT1
1350:  patient d/c.  iv removed, site benign.  the patient was given all d/c papers along with name of GI specialist to f/u with.  The patient was given instructions regarding prescription medication, including dosing and potential side effects.  the patient verbalized understanding of all instructions and ambulated out of the ed in stable condition.  if his symptoms return, he was instructed to return to the ed.  the patient ambulated out of the ed in stable condition, no distress and with all belongings.  joe randall.

## 2022-05-10 NOTE — ED PROVIDER NOTE - NSFOLLOWUPINSTRUCTIONS_ED_ALL_ED_FT
1. TAKE ALL MEDICATIONS AS DIRECTED.    2. FOLLOW UP WITH ____GASTROENTEROLOGY______ AS DIRECTED.  YOU WERE GIVEN COPIES OF ALL LABS AND IMAGING RESULTS FROM YOUR ER VISIT--PLEASE TAKE THEM WITH YOU TO YOUR APPOINTMENT.  3. IF NEEDED, CALL [phone] TO FIND A PRIMARY CARE PHYSICIAN.  OR CALL [phone] TO MAKE AN APPOINTMENT WITH THE MEDICAL CLINIC.  4. RETURN TO THE ER FOR ANY WORSENING SYMPTOMS.    Opciones de alimentos para ayudar a aliviar la diarrea en los adultos    Food Choices to Help Relieve Diarrhea, Adult      La diarrea puede hacerlo sentir débil y deshidratarlo. Es importante elegir los alimentos y las bebidas que dena adecuados para lo siguiente:  •Aliviar la diarrea.      •Remplazar los líquidos y nutrientes perdidos.      •Evitar la deshidratación.        Consejos para seguir catherine plan    Alivio de la diarrea   •Evite los alimentos que empeoren la diarrea. Estos incluyen:  •Alimentos y bebidas endulzados con jarabe de maíz de alto contenido de fructosa, miel o endulzantes, belinda xilitol, sorbitol y manitol.      •Comidas fritas, grasosas o condimentadas.      •Frutas y verduras crudas.        •Consuma alimentos con alto contenido de probióticos. Entre estos alimentos, se incluyen el yogur y los productos fermentados de la leche. Los probióticos pueden contribuir a aumentar la cantidad de bacterias saludables del estómago y de los intestinos (tubo digestivo o tracto gastrointestinal [GI]). [place] puede favorecer la digestión y detener la diarrea.      •Si tiene intolerancia a la lactosa, evite los productos lácteos. Estos podrían empeorar la diarrea.      •[place] los medicamentos para detener la diarrea solo belinda se lo haya indicado el médico.        Reemplazo de nutrientes      •Consuma alimentos blandos y fáciles de digerir en pequeñas cantidades, en la medida en que pueda, hasta que la diarrea comience a mejorar. Estos alimentos incluyen bananas, compota de manzana, arroz, tostadas y galletas saladas.    •De forma gradual, reincorpore alimentos con alto contenido de nutrientes según lo tolere o según se lo haya indicado el médico. [place] puede comprender lo siguiente:  •Alimentos proteicos darnell cocidos, belinda huevos, gio magras, belinda pescado o kathy sin piel, y tofu.      •Frutas y verduras peladas, sin semillas y apenas cocidas.      •Productos lácteos con bajo contenido de grasa.      •Cereales integrales.        •[place] suplementos de vitaminas y minerales belinda se lo haya indicado el médico.        Evitar la deshidratación      •Comience bebiendo pequeños sorbos de agua o de paola solución para evitar la deshidratación (solución de rehidratación oral, SRO). Esta es paola bebida que ayuda a reponer los líquidos y los minerales que el cuerpo perdió. Puede comprar paola SRO en farmacias y tiendas minoristas.      •Intente beber, al menos, entre 8 y 10 tazas (2,000-2,500 ml) de líquidos todos los días para ayudar a reponer los líquidos perdidos. Si tiene la orina de color amarillo pálido, está recibiendo la cantidad suficiente de líquidos.      •Puede beber otros líquidos además de agua, belinda jugo de frutas con agregado de agua (jugo de fruta diluido) o bebidas deportivas de bajas calorías, según lo tolere o según las indicaciones del médico.      •Evite consumir bebidas con cafeína, belinda café, té o gaseosas.      •Evite rose[place] alcohol.        Resumen    •Si tiene diarrea, es importante que elija los alimentos y las bebidas que dena adecuados para aliviar la diarrea, para reponer los líquidos y los nutrientes perdidos, y para prevenir la deshidratación.      •Debe beber suficiente cantidad de líquido para mantener la orina de color amarillo pálido.      •Al principio, puede ser beneficioso que consuma alimentos blandos. De forma gradual, reincorpore alimentos saludables, con alto contenido de nutrientes, según lo tolere o según se lo haya indicado el médico.      •Evite los alimentos que empeoren la diarrea, belinda los alimentos fritos, grasosos o condimentados.      Esta información no tiene belinda fin reemplazar el consejo del médico. Asegúrese de hacerle al médico cualquier pregunta que tenga.

## 2022-05-10 NOTE — ED ADULT NURSE NOTE - OBJECTIVE STATEMENT
Received pt c/o RUQ abd pain x 2 months with new onset frequent diarrhea x 8 days.   Pt states he was evals in ED 2 months ago at symptom onset and told "something was wrong with his gallbladder".   Abd soft tender to palpation to RUQ.   Pt reports ~4-5 episodes of diarrhea a day, unknown if blood in diarrhea.  Pt denies fevers at home.  Denies dietary change, no one at home with similar symptoms per pt. BN

## 2022-05-10 NOTE — ED PROVIDER NOTE - OBJECTIVE STATEMENT
55 y/o M with PMH BL inguinal hernia repair, hydrocele and 3 months fo RUQ pain presents to ED for diarrhea. Pt was seen here 2 month ago for RUQ pain. Work up was essentially negative , told he had gastritis and advised to fu with GI. Never did. Epigastric pain got better, states sometimes it is "inflamed" but now having 9 days of water diarrhea. No fever chills nausea or vomiting. No dysuria or hematuria. Appetite good. Pt reports he actully lost 10lbs in 2 months as eh has been trying to eat healthier due and not eating fatty/ greasy foods for the epigastric pain he was having. Denies recent travel or sick contacts. Took imodium and pepto bismol abut not helping. Had 4 BMs last night. Denies blood in stool. No recent abx use

## 2022-05-10 NOTE — ED PROVIDER NOTE - PROGRESS NOTE DETAILS
Reevaluated patient at bedside.  No episodes of diarhrea while inED. Discussed the results of all diagnostic testing in ED and copies of all reports given.   An opportunity to ask questions was given.  Discussed the importance of prompt, close medical follow-up.  Patient will return with any changes, concerns or persistent / worsening symptoms.  Understanding of all instructions verbalized.

## 2022-09-06 ENCOUNTER — OUTPATIENT (OUTPATIENT)
Dept: OUTPATIENT SERVICES | Facility: HOSPITAL | Age: 57
LOS: 1 days | End: 2022-09-06
Payer: OTHER MISCELLANEOUS

## 2022-09-06 DIAGNOSIS — R10.30 LOWER ABDOMINAL PAIN, UNSPECIFIED: ICD-10-CM

## 2022-09-06 DIAGNOSIS — Z98.890 OTHER SPECIFIED POSTPROCEDURAL STATES: Chronic | ICD-10-CM

## 2022-09-06 DIAGNOSIS — Z01.818 ENCOUNTER FOR OTHER PREPROCEDURAL EXAMINATION: ICD-10-CM

## 2022-09-06 LAB
ANION GAP SERPL CALC-SCNC: 5 MMOL/L — SIGNIFICANT CHANGE UP (ref 5–17)
BASOPHILS # BLD AUTO: 0.02 K/UL — SIGNIFICANT CHANGE UP (ref 0–0.2)
BASOPHILS NFR BLD AUTO: 0.3 % — SIGNIFICANT CHANGE UP (ref 0–2)
BUN SERPL-MCNC: 26 MG/DL — HIGH (ref 7–23)
CALCIUM SERPL-MCNC: 9.4 MG/DL — SIGNIFICANT CHANGE UP (ref 8.5–10.1)
CHLORIDE SERPL-SCNC: 109 MMOL/L — HIGH (ref 96–108)
CO2 SERPL-SCNC: 27 MMOL/L — SIGNIFICANT CHANGE UP (ref 22–31)
CREAT SERPL-MCNC: 1.18 MG/DL — SIGNIFICANT CHANGE UP (ref 0.5–1.3)
EGFR: 72 ML/MIN/1.73M2 — SIGNIFICANT CHANGE UP
EOSINOPHIL # BLD AUTO: 0.1 K/UL — SIGNIFICANT CHANGE UP (ref 0–0.5)
EOSINOPHIL NFR BLD AUTO: 1.6 % — SIGNIFICANT CHANGE UP (ref 0–6)
GLUCOSE SERPL-MCNC: 101 MG/DL — HIGH (ref 70–99)
HCT VFR BLD CALC: 44 % — SIGNIFICANT CHANGE UP (ref 39–50)
HGB BLD-MCNC: 14.6 G/DL — SIGNIFICANT CHANGE UP (ref 13–17)
IMM GRANULOCYTES NFR BLD AUTO: 0 % — SIGNIFICANT CHANGE UP (ref 0–1.5)
LYMPHOCYTES # BLD AUTO: 2.36 K/UL — SIGNIFICANT CHANGE UP (ref 1–3.3)
LYMPHOCYTES # BLD AUTO: 37.6 % — SIGNIFICANT CHANGE UP (ref 13–44)
MCHC RBC-ENTMCNC: 28 PG — SIGNIFICANT CHANGE UP (ref 27–34)
MCHC RBC-ENTMCNC: 33.2 GM/DL — SIGNIFICANT CHANGE UP (ref 32–36)
MCV RBC AUTO: 84.5 FL — SIGNIFICANT CHANGE UP (ref 80–100)
MONOCYTES # BLD AUTO: 0.33 K/UL — SIGNIFICANT CHANGE UP (ref 0–0.9)
MONOCYTES NFR BLD AUTO: 5.3 % — SIGNIFICANT CHANGE UP (ref 2–14)
NEUTROPHILS # BLD AUTO: 3.47 K/UL — SIGNIFICANT CHANGE UP (ref 1.8–7.4)
NEUTROPHILS NFR BLD AUTO: 55.2 % — SIGNIFICANT CHANGE UP (ref 43–77)
PLATELET # BLD AUTO: 331 K/UL — SIGNIFICANT CHANGE UP (ref 150–400)
POTASSIUM SERPL-MCNC: 4.6 MMOL/L — SIGNIFICANT CHANGE UP (ref 3.5–5.3)
POTASSIUM SERPL-SCNC: 4.6 MMOL/L — SIGNIFICANT CHANGE UP (ref 3.5–5.3)
RBC # BLD: 5.21 M/UL — SIGNIFICANT CHANGE UP (ref 4.2–5.8)
RBC # FLD: 13.3 % — SIGNIFICANT CHANGE UP (ref 10.3–14.5)
SODIUM SERPL-SCNC: 141 MMOL/L — SIGNIFICANT CHANGE UP (ref 135–145)
WBC # BLD: 6.28 K/UL — SIGNIFICANT CHANGE UP (ref 3.8–10.5)
WBC # FLD AUTO: 6.28 K/UL — SIGNIFICANT CHANGE UP (ref 3.8–10.5)

## 2022-09-06 PROCEDURE — 85025 COMPLETE CBC W/AUTO DIFF WBC: CPT

## 2022-09-06 PROCEDURE — 86900 BLOOD TYPING SEROLOGIC ABO: CPT

## 2022-09-06 PROCEDURE — 86850 RBC ANTIBODY SCREEN: CPT

## 2022-09-06 PROCEDURE — 93010 ELECTROCARDIOGRAM REPORT: CPT

## 2022-09-06 PROCEDURE — 86901 BLOOD TYPING SEROLOGIC RH(D): CPT

## 2022-09-06 PROCEDURE — 36415 COLL VENOUS BLD VENIPUNCTURE: CPT

## 2022-09-06 PROCEDURE — 93005 ELECTROCARDIOGRAM TRACING: CPT

## 2022-09-06 PROCEDURE — 80048 BASIC METABOLIC PNL TOTAL CA: CPT

## 2022-09-06 NOTE — ASU PATIENT PROFILE, ADULT - IS PATIENT PREGNANT?
HISTORY AND PHYSICAL      Javier Lambert Jr. is a 23 year old male.    Chief Complaint   Patient presents with   • Annual Exam     Here for his annual exam and is fasting for labs       HPI  HPI  Patient is here for an annual physical.    He is feeling well.  He is eating normally and believes the stress of college may have made him lose some wt.  He is getting regular exercise.  Past Medical History:   Diagnosis Date   • Allergy    • Headache        Past Surgical History:   Procedure Laterality Date   • No past surgeries         Current Outpatient Medications   Medication Sig Dispense Refill   • loratadine (CLARITIN) 10 MG tablet Take 10 mg by mouth daily as needed.       No current facility-administered medications for this visit.       ALLERGIES:  No Known Allergies    Family History   Problem Relation Age of Onset   • Depression Mother    • Hypertension Father        Social History     Socioeconomic History   • Marital status: Single     Spouse name: Not on file   • Number of children: Not on file   • Years of education: Not on file   • Highest education level: Not on file   Occupational History   • Not on file   Tobacco Use   • Smoking status: Never Smoker   • Smokeless tobacco: Never Used   Substance and Sexual Activity   • Alcohol use: Not Currently   • Drug use: Not Currently   • Sexual activity: Yes   Other Topics Concern   • Not on file   Social History Narrative   • Not on file     Social Determinants of Health     Financial Resource Strain: Not on file   Food Insecurity: Not on file   Transportation Needs: Not on file   Physical Activity: Not on file   Stress: Not on file   Social Connections: Not on file   Intimate Partner Violence: Not on file       Review of Systems   Constitutional: Negative for fatigue and fever.   HENT: Negative for congestion and rhinorrhea.    Respiratory: Negative for cough and shortness of breath.    Cardiovascular: Negative for chest pain and palpitations.   Gastrointestinal:  Negative for abdominal distention and abdominal pain.   Genitourinary: Negative for difficulty urinating and dysuria.   Musculoskeletal: Negative for arthralgias and back pain.   Neurological: Negative for dizziness and headaches.   Psychiatric/Behavioral: Negative for dysphoric mood. The patient is not nervous/anxious.        Visit Vitals  /70   Pulse 60   Temp 97.1 °F (36.2 °C) (Temporal)   Resp 16   Ht 5' 11\" (1.803 m)   Wt 59.4 kg (131 lb)   BMI 18.27 kg/m²       Physical Exam  Vitals and nursing note reviewed.   Constitutional:       Comments: Underweight   HENT:      Head: Normocephalic.      Right Ear: There is no impacted cerumen.      Nose: Nose normal.      Mouth/Throat:      Mouth: Mucous membranes are moist.      Pharynx: Oropharynx is clear. No oropharyngeal exudate or posterior oropharyngeal erythema.   Eyes:      Extraocular Movements: Extraocular movements intact.      Conjunctiva/sclera: Conjunctivae normal.      Pupils: Pupils are equal, round, and reactive to light.   Cardiovascular:      Rate and Rhythm: Normal rate and regular rhythm.      Pulses: Normal pulses.      Heart sounds: Normal heart sounds. No murmur heard.  Pulmonary:      Effort: Pulmonary effort is normal.      Breath sounds: Normal breath sounds. No rales.   Abdominal:      General: Abdomen is flat. Bowel sounds are normal.      Palpations: Abdomen is soft.      Tenderness: There is no abdominal tenderness.   Genitourinary:     Penis: Normal.       Testes: Normal.      Comments: No hernias  Musculoskeletal:         General: Normal range of motion.      Cervical back: Normal range of motion. No tenderness.      Right lower leg: No edema.      Left lower leg: No edema.   Lymphadenopathy:      Cervical: No cervical adenopathy.   Neurological:      General: No focal deficit present.      Mental Status: He is oriented to person, place, and time.      Sensory: No sensory deficit.      Motor: No weakness.      Gait: Gait normal.    Psychiatric:         Mood and Affect: Mood normal.         Problem List Items Addressed This Visit        ENT    Seasonal allergic rhinitis due to pollen      Other Visit Diagnoses     Annual physical exam    -  Primary    Relevant Orders    CBC WITH DIFFERENTIAL    COMPREHENSIVE METABOLIC PANEL    LIPID PANEL WITHOUT REFLEX    THYROID STIMULATING HORMONE      IMPRESSION/PLAN:  1. Annual physical exam  Pt is doing well.  Due for a booster shot which he is urged to get.  - CBC WITH DIFFERENTIAL  - COMPREHENSIVE METABOLIC PANEL  - LIPID PANEL WITHOUT REFLEX  - THYROID STIMULATING HORMONE    2. Seasonal allergic rhinitis due to pollen  Uses claritin prn.    3. Underweight  Some wt loss due to stress of college posssibly but pt is always quite thin.   not applicable (Male)

## 2022-09-06 NOTE — CHART NOTE - NSCHARTNOTEFT_GEN_A_CORE
58 y/o male accompanied by son Jason presents to [facility] for scheduled right inguinal hernia mesh removal on 9/13/22.    vs- 121/ 83 rr 14 hr 77 temp 98.3      cbc, bmp, type and screen ekg, done today in PST     right groin pain   Pre op and chlorhexidine instructions given and explained.  Avoid NSAIDs and OTC supplements.   Patient verbalized understanding  medical consult requested by surgeon  covid 19 swab scheduled 9/10/22, advised to quarantine after test

## 2022-09-06 NOTE — ASU PATIENT PROFILE, ADULT - NSICDXPASTSURGICALHX_GEN_ALL_CORE_FT
PAST SURGICAL HISTORY:  H/O inguinal hernia repair 2011 right    History of hydrocelectomy 5/2021

## 2022-09-07 DIAGNOSIS — R10.30 LOWER ABDOMINAL PAIN, UNSPECIFIED: ICD-10-CM

## 2022-09-07 DIAGNOSIS — Z01.818 ENCOUNTER FOR OTHER PREPROCEDURAL EXAMINATION: ICD-10-CM

## 2022-09-12 RX ORDER — HYDROMORPHONE HYDROCHLORIDE 2 MG/ML
0.5 INJECTION INTRAMUSCULAR; INTRAVENOUS; SUBCUTANEOUS
Refills: 0 | Status: DISCONTINUED | OUTPATIENT
Start: 2022-09-13 | End: 2022-09-13

## 2022-09-12 RX ORDER — ONDANSETRON 8 MG/1
4 TABLET, FILM COATED ORAL ONCE
Refills: 0 | Status: DISCONTINUED | OUTPATIENT
Start: 2022-09-13 | End: 2022-09-13

## 2022-09-12 RX ORDER — SODIUM CHLORIDE 9 MG/ML
1000 INJECTION, SOLUTION INTRAVENOUS
Refills: 0 | Status: DISCONTINUED | OUTPATIENT
Start: 2022-09-13 | End: 2022-09-13

## 2022-09-12 RX ORDER — FENTANYL CITRATE 50 UG/ML
50 INJECTION INTRAVENOUS
Refills: 0 | Status: DISCONTINUED | OUTPATIENT
Start: 2022-09-13 | End: 2022-09-13

## 2022-09-12 RX ORDER — ACETAMINOPHEN 500 MG
1000 TABLET ORAL ONCE
Refills: 0 | Status: DISCONTINUED | OUTPATIENT
Start: 2022-09-13 | End: 2022-09-13

## 2022-09-13 ENCOUNTER — RESULT REVIEW (OUTPATIENT)
Age: 57
End: 2022-09-13

## 2022-09-13 ENCOUNTER — OUTPATIENT (OUTPATIENT)
Dept: INPATIENT UNIT | Facility: HOSPITAL | Age: 57
LOS: 1 days | Discharge: HOME | End: 2022-09-13
Payer: OTHER MISCELLANEOUS

## 2022-09-13 VITALS
DIASTOLIC BLOOD PRESSURE: 77 MMHG | HEIGHT: 63 IN | SYSTOLIC BLOOD PRESSURE: 119 MMHG | OXYGEN SATURATION: 99 % | RESPIRATION RATE: 16 BRPM | HEART RATE: 85 BPM | TEMPERATURE: 98 F | WEIGHT: 154.1 LBS

## 2022-09-13 DIAGNOSIS — K66.0 PERITONEAL ADHESIONS (POSTPROCEDURAL) (POSTINFECTION): ICD-10-CM

## 2022-09-13 DIAGNOSIS — Z98.890 OTHER SPECIFIED POSTPROCEDURAL STATES: Chronic | ICD-10-CM

## 2022-09-13 DIAGNOSIS — K40.91 UNILATERAL INGUINAL HERNIA, WITHOUT OBSTRUCTION OR GANGRENE, RECURRENT: ICD-10-CM

## 2022-09-13 DIAGNOSIS — G58.8 OTHER SPECIFIED MONONEUROPATHIES: ICD-10-CM

## 2022-09-13 DIAGNOSIS — G97.48: ICD-10-CM

## 2022-09-13 DIAGNOSIS — R10.30 LOWER ABDOMINAL PAIN, UNSPECIFIED: ICD-10-CM

## 2022-09-13 LAB
HCT VFR BLD CALC: 36.9 % — LOW (ref 39–50)
HGB BLD-MCNC: 12.3 G/DL — LOW (ref 13–17)

## 2022-09-13 PROCEDURE — 12345: CPT | Mod: NC,AS

## 2022-09-13 PROCEDURE — 88302 TISSUE EXAM BY PATHOLOGIST: CPT | Mod: 26

## 2022-09-13 PROCEDURE — 36415 COLL VENOUS BLD VENIPUNCTURE: CPT

## 2022-09-13 PROCEDURE — 80048 BASIC METABOLIC PNL TOTAL CA: CPT

## 2022-09-13 PROCEDURE — 85018 HEMOGLOBIN: CPT

## 2022-09-13 PROCEDURE — 88302 TISSUE EXAM BY PATHOLOGIST: CPT

## 2022-09-13 PROCEDURE — C1889: CPT

## 2022-09-13 PROCEDURE — 85014 HEMATOCRIT: CPT

## 2022-09-13 PROCEDURE — 85027 COMPLETE CBC AUTOMATED: CPT

## 2022-09-13 RX ORDER — MORPHINE SULFATE 50 MG/1
4 CAPSULE, EXTENDED RELEASE ORAL EVERY 4 HOURS
Refills: 0 | Status: DISCONTINUED | OUTPATIENT
Start: 2022-09-13 | End: 2022-09-14

## 2022-09-13 RX ORDER — INFLUENZA VIRUS VACCINE 15; 15; 15; 15 UG/.5ML; UG/.5ML; UG/.5ML; UG/.5ML
0.5 SUSPENSION INTRAMUSCULAR ONCE
Refills: 0 | Status: DISCONTINUED | OUTPATIENT
Start: 2022-09-13 | End: 2022-09-14

## 2022-09-13 RX ORDER — SODIUM CHLORIDE 9 MG/ML
1000 INJECTION, SOLUTION INTRAVENOUS
Refills: 0 | Status: DISCONTINUED | OUTPATIENT
Start: 2022-09-13 | End: 2022-09-14

## 2022-09-13 RX ORDER — OXYCODONE AND ACETAMINOPHEN 5; 325 MG/1; MG/1
2 TABLET ORAL EVERY 4 HOURS
Refills: 0 | Status: DISCONTINUED | OUTPATIENT
Start: 2022-09-13 | End: 2022-09-14

## 2022-09-13 RX ORDER — OXYCODONE HYDROCHLORIDE 5 MG/1
5 TABLET ORAL ONCE
Refills: 0 | Status: DISCONTINUED | OUTPATIENT
Start: 2022-09-13 | End: 2022-09-13

## 2022-09-13 RX ADMIN — FENTANYL CITRATE 50 MICROGRAM(S): 50 INJECTION INTRAVENOUS at 14:16

## 2022-09-13 RX ADMIN — SODIUM CHLORIDE 100 MILLILITER(S): 9 INJECTION, SOLUTION INTRAVENOUS at 23:31

## 2022-09-13 RX ADMIN — OXYCODONE HYDROCHLORIDE 5 MILLIGRAM(S): 5 TABLET ORAL at 15:00

## 2022-09-13 RX ADMIN — SODIUM CHLORIDE 100 MILLILITER(S): 9 INJECTION, SOLUTION INTRAVENOUS at 14:49

## 2022-09-13 RX ADMIN — OXYCODONE HYDROCHLORIDE 5 MILLIGRAM(S): 5 TABLET ORAL at 14:39

## 2022-09-13 RX ADMIN — FENTANYL CITRATE 50 MICROGRAM(S): 50 INJECTION INTRAVENOUS at 14:30

## 2022-09-13 NOTE — ASU PREOP CHECKLIST - NSBLOODTRANS_GEN_A_CORE_SIUH
How Severe Is Your Skin Lesion?: mild
Has Your Skin Lesion Been Treated?: not been treated
Is This A New Presentation, Or A Follow-Up?: Skin Lesion
no...

## 2022-09-13 NOTE — ASU PATIENT PROFILE, ADULT - FALL HARM RISK - UNIVERSAL INTERVENTIONS
Bed in lowest position, wheels locked, appropriate side rails in place/Call bell, personal items and telephone in reach/Instruct patient to call for assistance before getting out of bed or chair/Non-slip footwear when patient is out of bed/[place] to call system/Physically safe environment - no spills, clutter or unnecessary equipment/Purposeful Proactive Rounding/Room/bathroom lighting operational, light cord in reach

## 2022-09-14 ENCOUNTER — TRANSCRIPTION ENCOUNTER (OUTPATIENT)
Age: 57
End: 2022-09-14

## 2022-09-14 VITALS
OXYGEN SATURATION: 99 % | RESPIRATION RATE: 18 BRPM | HEART RATE: 86 BPM | DIASTOLIC BLOOD PRESSURE: 67 MMHG | TEMPERATURE: 98 F | SYSTOLIC BLOOD PRESSURE: 106 MMHG

## 2022-09-14 LAB
ANION GAP SERPL CALC-SCNC: 6 MMOL/L — SIGNIFICANT CHANGE UP (ref 5–17)
BUN SERPL-MCNC: 16 MG/DL — SIGNIFICANT CHANGE UP (ref 7–23)
CALCIUM SERPL-MCNC: 8.5 MG/DL — SIGNIFICANT CHANGE UP (ref 8.5–10.1)
CHLORIDE SERPL-SCNC: 109 MMOL/L — HIGH (ref 96–108)
CO2 SERPL-SCNC: 26 MMOL/L — SIGNIFICANT CHANGE UP (ref 22–31)
CREAT SERPL-MCNC: 0.88 MG/DL — SIGNIFICANT CHANGE UP (ref 0.5–1.3)
EGFR: 101 ML/MIN/1.73M2 — SIGNIFICANT CHANGE UP
GLUCOSE SERPL-MCNC: 99 MG/DL — SIGNIFICANT CHANGE UP (ref 70–99)
HCT VFR BLD CALC: 30.3 % — LOW (ref 39–50)
HCT VFR BLD CALC: 30.6 % — LOW (ref 39–50)
HCT VFR BLD CALC: 31.4 % — LOW (ref 39–50)
HGB BLD-MCNC: 10.2 G/DL — LOW (ref 13–17)
HGB BLD-MCNC: 10.4 G/DL — LOW (ref 13–17)
HGB BLD-MCNC: 9.9 G/DL — LOW (ref 13–17)
MCHC RBC-ENTMCNC: 27.3 PG — SIGNIFICANT CHANGE UP (ref 27–34)
MCHC RBC-ENTMCNC: 32.4 GM/DL — SIGNIFICANT CHANGE UP (ref 32–36)
MCV RBC AUTO: 84.5 FL — SIGNIFICANT CHANGE UP (ref 80–100)
PLATELET # BLD AUTO: 278 K/UL — SIGNIFICANT CHANGE UP (ref 150–400)
POTASSIUM SERPL-MCNC: 4 MMOL/L — SIGNIFICANT CHANGE UP (ref 3.5–5.3)
POTASSIUM SERPL-SCNC: 4 MMOL/L — SIGNIFICANT CHANGE UP (ref 3.5–5.3)
RBC # BLD: 3.62 M/UL — LOW (ref 4.2–5.8)
RBC # FLD: 13.3 % — SIGNIFICANT CHANGE UP (ref 10.3–14.5)
SODIUM SERPL-SCNC: 141 MMOL/L — SIGNIFICANT CHANGE UP (ref 135–145)
WBC # BLD: 10.5 K/UL — SIGNIFICANT CHANGE UP (ref 3.8–10.5)
WBC # FLD AUTO: 10.5 K/UL — SIGNIFICANT CHANGE UP (ref 3.8–10.5)

## 2022-09-14 RX ORDER — ACETAMINOPHEN 500 MG
2 TABLET ORAL
Qty: 0 | Refills: 0 | DISCHARGE
Start: 2022-09-14

## 2022-09-14 RX ORDER — ACETAMINOPHEN 500 MG
650 TABLET ORAL EVERY 6 HOURS
Refills: 0 | Status: DISCONTINUED | OUTPATIENT
Start: 2022-09-14 | End: 2022-09-14

## 2022-09-14 RX ADMIN — Medication 650 MILLIGRAM(S): at 11:32

## 2022-09-14 RX ADMIN — Medication 650 MILLIGRAM(S): at 11:02

## 2022-09-14 RX ADMIN — SODIUM CHLORIDE 100 MILLILITER(S): 9 INJECTION, SOLUTION INTRAVENOUS at 09:37

## 2022-09-14 NOTE — PROGRESS NOTE ADULT - SUBJECTIVE AND OBJECTIVE BOX
Feels well no pain at surgical site  VSS afeb  Abd- soft non tender, small amt blood on dressing, no echymosis  Ext- no edema
Feels well, no complaints, no pain  VSS afeb  Abd- soft non tender, wound clean dry  Ext- no edema

## 2022-09-14 NOTE — DISCHARGE NOTE NURSING/CASE MANAGEMENT/SOCIAL WORK - NSDCPEFALRISK_GEN_ALL_CORE
For information on Fall & Injury Prevention, visit: [email or web address] OR  [email or web address] OR  https://www.cdc.gov/steadi/patient.html

## 2022-09-14 NOTE — DISCHARGE NOTE NURSING/CASE MANAGEMENT/SOCIAL WORK - PATIENT PORTAL LINK FT
You can access the FollowMyHealth Patient Portal offered by [facility] by registering at the following website: [email or web address]. By joining [employer]’s FollowMyHealth portal, you will also be able to view your health information using other applications (apps) compatible with our system.

## 2022-09-14 NOTE — ASU DISCHARGE PLAN (ADULT/PEDIATRIC) - NS MD DC FALL RISK RISK
For information on Fall & Injury Prevention, visit: [email or web address] OR  [email or web address] OR  https://www.cdc.gov/steadi/patient.html

## 2022-11-21 NOTE — ED ADULT NURSE NOTE - CHIEF COMPLAINT QUOTE
Next Appointment: 4/5/23  Last seen:  4/6/22  Last refill:  8/22/22  Refilled per protocol     pt is s/p hernia repair in May and since then has been having recurrent testicular pain, RLQ pain, and now back pain.  Pt is scheduled for surgery for a hydrocele on 9/20 this month but has had increased pain over the past few days which prompted him to the ED.  pt is a patient of DR. Parekh.

## 2023-03-01 NOTE — H&P PST ADULT - HEART RATE (BEATS/MIN)
Mirvaso Pregnancy And Lactation Text: This medication has not been assigned a Pregnancy Risk Category. It is unknown if the medication is excreted in breast milk. 81

## 2023-03-22 NOTE — ASU PREOP CHECKLIST - LOOSE TEETH
Lab work was unremarkable here other than your blood sugar was initially high.  Currently is under control.    Drink plenty fluids.  Monitor your diet.  Take your insulin as prescribed and follow-up with [facility] for further evaluation    Ankle x-ray was negative.  Ice to affected area.  Ace wrap for comfort.    Alternate Tylenol /Motrin as needed for pain  
no

## 2023-07-19 NOTE — ED ADULT TRIAGE NOTE - CCCP TRG CHIEF CMPLNT
Prior authorization for pregabalin (Lyrica) 150 mg capsule started on Cover My Meds, clinical uploaded, authorization pending Sanchez: Kyrie LOVE Case ID: [id number] - Rx #: [id number]. Approvedtoday  [id number];JFXAPV:[id number]; Review Type:Prior Auth; Coverage Start Date:07/01/2023; Coverage End Date:07/30/2024;
testicular pain

## 2023-08-08 NOTE — ED PROVIDER NOTE - BIRTH SEX
Male Dapsone Pregnancy And Lactation Text: This medication is Pregnancy Category C and is not considered safe during pregnancy or breast feeding.

## 2023-09-11 NOTE — ED PROVIDER NOTE - CHPI ED SYMPTOMS NEG
no nausea/no vomiting/no burning urination Topical Sulfur Applications Counseling: Topical Sulfur Counseling: Patient counseled that this medication may cause skin irritation or allergic reactions.  In the event of skin irritation, the patient was advised to reduce the amount of the drug applied or use it less frequently.   The patient verbalized understanding of the proper use and possible adverse effects of topical sulfur application.  All of the patient's questions and concerns were addressed.

## 2024-10-02 NOTE — BRIEF OPERATIVE NOTE - PRIMARY SURGEON
Spoke with patient and notified Dr. Noland reviewed right shoulder x-ray and recommends physical therapy eval and treat then follow up. Patient is agreeable. Order submitted. Patient advised she will receive a call from [facility] to schedule appointment. Patient verbalize understanding.   Izabella

## 2024-11-13 NOTE — H&P PST ADULT - NS NEC GEN PE MLT EXAM PC
Detail Level: Detailed Depth Of Biopsy: dermis Was A Bandage Applied: Yes Size Of Lesion In Cm: 0 Biopsy Type: H and E Biopsy Method: Dermablade Anesthesia Type: 1% lidocaine with 1:200,000 epinephrine Anesthesia Volume In Cc: 0.3 Hemostasis: Aluminum Chloride and Electrocautery Wound Care: No ointment Dressing: bandage Destruction After The Procedure: No Type Of Destruction Used: Curettage Curettage Text: The wound bed was treated with curettage after the biopsy was performed. Cryotherapy Text: The wound bed was treated with cryotherapy after the biopsy was performed. Electrodesiccation Text: The wound bed was treated with electrodesiccation after the biopsy was performed. Electrodesiccation And Curettage Text: The wound bed was treated with electrodesiccation and curettage after the biopsy was performed. Silver Nitrate Text: The wound bed was treated with silver nitrate after the biopsy was performed. Lab: [id number] Lab Facility: [id number] Consent: Written consent was obtained and risks were reviewed including but not limited to scarring, infection, bleeding, scabbing, incomplete removal, nerve damage and allergy to anesthesia. Post-Care Instructions: I reviewed with the patient in detail post-care instructions. Patient is to keep the biopsy site dry overnight, and then apply bacitracin twice daily until healed. Patient may apply hydrogen peroxide soaks to remove any crusting. Notification Instructions: Patient will be notified of biopsy results. However, patient instructed to call the office if not contacted within 2 weeks. Billing Type: Third-Party Bill Information: Selecting Yes will display possible errors in your note based on the variables you have selected. This validation is only offered as a suggestion for you. PLEASE NOTE THAT THE VALIDATION TEXT WILL BE REMOVED WHEN YOU FINALIZE YOUR NOTE. IF YOU WANT TO FAX A PRELIMINARY NOTE YOU WILL NEED TO TOGGLE THIS TO 'NO' IF YOU DO NOT WANT IT IN YOUR FAXED NOTE. detailed exam

## 2025-08-11 ENCOUNTER — EMERGENCY (EMERGENCY)
Facility: HOSPITAL | Age: 60
LOS: 1 days | End: 2025-08-11
Attending: STUDENT IN AN ORGANIZED HEALTH CARE EDUCATION/TRAINING PROGRAM | Admitting: STUDENT IN AN ORGANIZED HEALTH CARE EDUCATION/TRAINING PROGRAM
Payer: SELF-PAY

## 2025-08-11 VITALS
OXYGEN SATURATION: 98 % | WEIGHT: 151.9 LBS | HEIGHT: 63 IN | SYSTOLIC BLOOD PRESSURE: 124 MMHG | DIASTOLIC BLOOD PRESSURE: 84 MMHG | TEMPERATURE: 98 F | HEART RATE: 83 BPM | RESPIRATION RATE: 18 BRPM

## 2025-08-11 VITALS
RESPIRATION RATE: 18 BRPM | TEMPERATURE: 97 F | HEART RATE: 84 BPM | DIASTOLIC BLOOD PRESSURE: 60 MMHG | SYSTOLIC BLOOD PRESSURE: 110 MMHG | OXYGEN SATURATION: 99 %

## 2025-08-11 DIAGNOSIS — Z98.890 OTHER SPECIFIED POSTPROCEDURAL STATES: Chronic | ICD-10-CM

## 2025-08-11 LAB
ALBUMIN SERPL ELPH-MCNC: 3.7 G/DL — SIGNIFICANT CHANGE UP (ref 3.3–5)
ALP SERPL-CCNC: 70 U/L — SIGNIFICANT CHANGE UP (ref 40–120)
ALT FLD-CCNC: 35 U/L — SIGNIFICANT CHANGE UP (ref 12–78)
ANION GAP SERPL CALC-SCNC: 7 MMOL/L — SIGNIFICANT CHANGE UP (ref 5–17)
APPEARANCE UR: CLEAR — SIGNIFICANT CHANGE UP
AST SERPL-CCNC: 18 U/L — SIGNIFICANT CHANGE UP (ref 15–37)
BASOPHILS # BLD AUTO: 0.01 K/UL — SIGNIFICANT CHANGE UP (ref 0–0.2)
BASOPHILS NFR BLD AUTO: 0.1 % — SIGNIFICANT CHANGE UP (ref 0–2)
BILIRUB SERPL-MCNC: 0.2 MG/DL — SIGNIFICANT CHANGE UP (ref 0.2–1.2)
BILIRUB UR-MCNC: NEGATIVE — SIGNIFICANT CHANGE UP
BUN SERPL-MCNC: 17 MG/DL — SIGNIFICANT CHANGE UP (ref 7–23)
CALCIUM SERPL-MCNC: 9 MG/DL — SIGNIFICANT CHANGE UP (ref 8.5–10.1)
CHLORIDE SERPL-SCNC: 105 MMOL/L — SIGNIFICANT CHANGE UP (ref 96–108)
CO2 SERPL-SCNC: 26 MMOL/L — SIGNIFICANT CHANGE UP (ref 22–31)
COLOR SPEC: YELLOW — SIGNIFICANT CHANGE UP
CREAT SERPL-MCNC: 1.1 MG/DL — SIGNIFICANT CHANGE UP (ref 0.5–1.3)
DIFF PNL FLD: NEGATIVE — SIGNIFICANT CHANGE UP
EGFR: 78 ML/MIN/1.73M2 — SIGNIFICANT CHANGE UP
EGFR: 78 ML/MIN/1.73M2 — SIGNIFICANT CHANGE UP
EOSINOPHIL # BLD AUTO: 0.02 K/UL — SIGNIFICANT CHANGE UP (ref 0–0.5)
EOSINOPHIL NFR BLD AUTO: 0.3 % — SIGNIFICANT CHANGE UP (ref 0–6)
GLUCOSE SERPL-MCNC: 117 MG/DL — HIGH (ref 70–99)
GLUCOSE UR QL: NEGATIVE MG/DL — SIGNIFICANT CHANGE UP
HCT VFR BLD CALC: 40.7 % — SIGNIFICANT CHANGE UP (ref 39–50)
HGB BLD-MCNC: 13.7 G/DL — SIGNIFICANT CHANGE UP (ref 13–17)
IMM GRANULOCYTES # BLD AUTO: 0.02 K/UL — SIGNIFICANT CHANGE UP (ref 0–0.07)
IMM GRANULOCYTES NFR BLD AUTO: 0.3 % — SIGNIFICANT CHANGE UP (ref 0–0.9)
KETONES UR QL: NEGATIVE MG/DL — SIGNIFICANT CHANGE UP
LEUKOCYTE ESTERASE UR-ACNC: NEGATIVE — SIGNIFICANT CHANGE UP
LYMPHOCYTES # BLD AUTO: 1.48 K/UL — SIGNIFICANT CHANGE UP (ref 1–3.3)
LYMPHOCYTES NFR BLD AUTO: 21.6 % — SIGNIFICANT CHANGE UP (ref 13–44)
MCHC RBC-ENTMCNC: 28.1 PG — SIGNIFICANT CHANGE UP (ref 27–34)
MCHC RBC-ENTMCNC: 33.7 G/DL — SIGNIFICANT CHANGE UP (ref 32–36)
MCV RBC AUTO: 83.4 FL — SIGNIFICANT CHANGE UP (ref 80–100)
MONOCYTES # BLD AUTO: 0.35 K/UL — SIGNIFICANT CHANGE UP (ref 0–0.9)
MONOCYTES NFR BLD AUTO: 5.1 % — SIGNIFICANT CHANGE UP (ref 2–14)
NEUTROPHILS # BLD AUTO: 4.96 K/UL — SIGNIFICANT CHANGE UP (ref 1.8–7.4)
NEUTROPHILS NFR BLD AUTO: 72.6 % — SIGNIFICANT CHANGE UP (ref 43–77)
NITRITE UR-MCNC: NEGATIVE — SIGNIFICANT CHANGE UP
NRBC # BLD AUTO: 0 K/UL — SIGNIFICANT CHANGE UP (ref 0–0)
NRBC # FLD: 0 K/UL — SIGNIFICANT CHANGE UP (ref 0–0)
NRBC BLD AUTO-RTO: 0 /100 WBCS — SIGNIFICANT CHANGE UP (ref 0–0)
PH UR: 5.5 — SIGNIFICANT CHANGE UP (ref 5–8)
PLATELET # BLD AUTO: 341 K/UL — SIGNIFICANT CHANGE UP (ref 150–400)
PMV BLD: 8.9 FL — SIGNIFICANT CHANGE UP (ref 7–13)
POTASSIUM SERPL-MCNC: 4.2 MMOL/L — SIGNIFICANT CHANGE UP (ref 3.5–5.3)
POTASSIUM SERPL-SCNC: 4.2 MMOL/L — SIGNIFICANT CHANGE UP (ref 3.5–5.3)
PROT SERPL-MCNC: 6.9 G/DL — SIGNIFICANT CHANGE UP (ref 6–8.3)
PROT UR-MCNC: NEGATIVE MG/DL — SIGNIFICANT CHANGE UP
RBC # BLD: 4.88 M/UL — SIGNIFICANT CHANGE UP (ref 4.2–5.8)
RBC # FLD: 13.3 % — SIGNIFICANT CHANGE UP (ref 10.3–14.5)
SODIUM SERPL-SCNC: 138 MMOL/L — SIGNIFICANT CHANGE UP (ref 135–145)
SP GR SPEC: 1.02 — SIGNIFICANT CHANGE UP (ref 1–1.03)
UROBILINOGEN FLD QL: 0.2 MG/DL — SIGNIFICANT CHANGE UP (ref 0.2–1)
WBC # BLD: 6.84 K/UL — SIGNIFICANT CHANGE UP (ref 3.8–10.5)
WBC # FLD AUTO: 6.84 K/UL — SIGNIFICANT CHANGE UP (ref 3.8–10.5)

## 2025-08-11 PROCEDURE — 36415 COLL VENOUS BLD VENIPUNCTURE: CPT

## 2025-08-11 PROCEDURE — 99285 EMERGENCY DEPT VISIT HI MDM: CPT | Mod: 25

## 2025-08-11 PROCEDURE — 93975 VASCULAR STUDY: CPT | Mod: 26

## 2025-08-11 PROCEDURE — 99285 EMERGENCY DEPT VISIT HI MDM: CPT

## 2025-08-11 PROCEDURE — 76870 US EXAM SCROTUM: CPT | Mod: 26

## 2025-08-11 PROCEDURE — 74177 CT ABD & PELVIS W/CONTRAST: CPT

## 2025-08-11 PROCEDURE — 81003 URINALYSIS AUTO W/O SCOPE: CPT

## 2025-08-11 PROCEDURE — 74177 CT ABD & PELVIS W/CONTRAST: CPT | Mod: 26

## 2025-08-11 PROCEDURE — 96374 THER/PROPH/DIAG INJ IV PUSH: CPT | Mod: XU

## 2025-08-11 PROCEDURE — 76870 US EXAM SCROTUM: CPT

## 2025-08-11 PROCEDURE — 80053 COMPREHEN METABOLIC PANEL: CPT

## 2025-08-11 PROCEDURE — 93975 VASCULAR STUDY: CPT

## 2025-08-11 PROCEDURE — 85025 COMPLETE CBC W/AUTO DIFF WBC: CPT

## 2025-08-11 RX ORDER — KETOROLAC TROMETHAMINE 30 MG/ML
30 INJECTION, SOLUTION INTRAMUSCULAR; INTRAVENOUS ONCE
Refills: 0 | Status: DISCONTINUED | OUTPATIENT
Start: 2025-08-11 | End: 2025-08-11

## 2025-08-11 RX ADMIN — KETOROLAC TROMETHAMINE 30 MILLIGRAM(S): 30 INJECTION, SOLUTION INTRAMUSCULAR; INTRAVENOUS at 14:29

## 2025-08-11 RX ADMIN — Medication 1000 MILLILITER(S): at 14:29

## 2025-08-28 ENCOUNTER — NON-APPOINTMENT (OUTPATIENT)
Age: 60
End: 2025-08-28

## 2025-08-29 ENCOUNTER — APPOINTMENT (OUTPATIENT)
Dept: UROLOGY | Facility: CLINIC | Age: 60
End: 2025-08-29

## 2025-08-29 DIAGNOSIS — N50.89 OTHER SPECIFIED DISORDERS OF THE MALE GENITAL ORGANS: ICD-10-CM

## 2025-08-29 PROCEDURE — 99204 OFFICE O/P NEW MOD 45 MIN: CPT

## 2025-09-08 ENCOUNTER — APPOINTMENT (OUTPATIENT)
Dept: MRI IMAGING | Facility: CLINIC | Age: 60
End: 2025-09-08
Payer: MEDICAID

## 2025-09-08 PROCEDURE — 72197 MRI PELVIS W/O & W/DYE: CPT

## 2025-09-08 PROCEDURE — A9585: CPT

## 2025-09-18 PROBLEM — N43.3 HYDROCELE, LEFT: Status: ACTIVE | Noted: 2025-09-18

## 2025-09-19 ENCOUNTER — APPOINTMENT (OUTPATIENT)
Dept: UROLOGY | Facility: CLINIC | Age: 60
End: 2025-09-19

## 2025-09-19 DIAGNOSIS — N50.3 CYST OF EPIDIDYMIS: ICD-10-CM

## 2025-09-19 DIAGNOSIS — N50.811 RIGHT TESTICULAR PAIN: ICD-10-CM
